# Patient Record
Sex: MALE | Race: WHITE | NOT HISPANIC OR LATINO | Employment: OTHER | URBAN - NONMETROPOLITAN AREA
[De-identification: names, ages, dates, MRNs, and addresses within clinical notes are randomized per-mention and may not be internally consistent; named-entity substitution may affect disease eponyms.]

---

## 2020-11-18 ENCOUNTER — APPOINTMENT (OUTPATIENT)
Dept: LAB | Facility: HOSPITAL | Age: 70
End: 2020-11-18
Attending: PHYSICIAN ASSISTANT
Payer: MEDICARE

## 2020-11-18 DIAGNOSIS — Z20.828 EXPOSURE TO SARS-ASSOCIATED CORONAVIRUS: ICD-10-CM

## 2020-11-18 DIAGNOSIS — R05.9 COUGH: ICD-10-CM

## 2020-11-18 DIAGNOSIS — R50.9 HYPERTHERMIA-INDUCED DEFECT: ICD-10-CM

## 2020-11-18 PROCEDURE — U0003 INFECTIOUS AGENT DETECTION BY NUCLEIC ACID (DNA OR RNA); SEVERE ACUTE RESPIRATORY SYNDROME CORONAVIRUS 2 (SARS-COV-2) (CORONAVIRUS DISEASE [COVID-19]), AMPLIFIED PROBE TECHNIQUE, MAKING USE OF HIGH THROUGHPUT TECHNOLOGIES AS DESCRIBED BY CMS-2020-01-R: HCPCS

## 2020-11-20 DIAGNOSIS — U07.1 COVID-19 VIRUS DETECTED: ICD-10-CM

## 2020-11-20 LAB — SARS-COV-2 RNA RESP QL NAA+PROBE: DETECTED

## 2020-11-22 ENCOUNTER — HOSPITAL ENCOUNTER (EMERGENCY)
Facility: HOSPITAL | Age: 70
Discharge: HOME | End: 2020-11-22
Attending: FAMILY MEDICINE
Payer: MEDICARE

## 2020-11-22 VITALS
TEMPERATURE: 99 F | HEART RATE: 79 BPM | RESPIRATION RATE: 18 BRPM | SYSTOLIC BLOOD PRESSURE: 107 MMHG | BODY MASS INDEX: 31.1 KG/M2 | WEIGHT: 210 LBS | OXYGEN SATURATION: 97 % | DIASTOLIC BLOOD PRESSURE: 56 MMHG | HEIGHT: 69 IN

## 2020-11-22 DIAGNOSIS — R05.9 COUGH: ICD-10-CM

## 2020-11-22 DIAGNOSIS — U07.1 COVID-19: Primary | ICD-10-CM

## 2020-11-22 DIAGNOSIS — R06.02 SOB (SHORTNESS OF BREATH): ICD-10-CM

## 2020-11-22 LAB
ALBUMIN SERPL BCP-MCNC: 3.2 G/DL (ref 3.5–5.2)
ALP SERPL-CCNC: 77 U/L (ref 55–135)
ALT SERPL W/O P-5'-P-CCNC: 24 U/L (ref 10–44)
ANION GAP SERPL CALC-SCNC: 6 MMOL/L (ref 8–16)
AST SERPL-CCNC: 24 U/L (ref 10–40)
BASOPHILS # BLD AUTO: 0.01 K/UL (ref 0–0.2)
BASOPHILS NFR BLD: 0.1 % (ref 0–1.9)
BILIRUB SERPL-MCNC: 0.8 MG/DL (ref 0.1–1)
BUN SERPL-MCNC: 17 MG/DL (ref 8–23)
CALCIUM SERPL-MCNC: 8.7 MG/DL (ref 8.7–10.5)
CHLORIDE SERPL-SCNC: 101 MMOL/L (ref 95–110)
CO2 SERPL-SCNC: 28 MMOL/L (ref 23–29)
CREAT SERPL-MCNC: 1.1 MG/DL (ref 0.5–1.4)
DIFFERENTIAL METHOD: ABNORMAL
EOSINOPHIL # BLD AUTO: 0 K/UL (ref 0–0.5)
EOSINOPHIL NFR BLD: 0 % (ref 0–8)
ERYTHROCYTE [DISTWIDTH] IN BLOOD BY AUTOMATED COUNT: 13.2 % (ref 11.5–14.5)
EST. GFR  (AFRICAN AMERICAN): >60 ML/MIN/1.73 M^2
EST. GFR  (NON AFRICAN AMERICAN): >60 ML/MIN/1.73 M^2
GLUCOSE SERPL-MCNC: 101 MG/DL (ref 70–110)
HCT VFR BLD AUTO: 47 % (ref 40–54)
HGB BLD-MCNC: 15.7 G/DL (ref 14–18)
IMM GRANULOCYTES # BLD AUTO: 0.03 K/UL (ref 0–0.04)
IMM GRANULOCYTES NFR BLD AUTO: 0.4 % (ref 0–0.5)
LYMPHOCYTES # BLD AUTO: 0.7 K/UL (ref 1–4.8)
LYMPHOCYTES NFR BLD: 8.9 % (ref 18–48)
MCH RBC QN AUTO: 29.4 PG (ref 27–31)
MCHC RBC AUTO-ENTMCNC: 33.4 G/DL (ref 32–36)
MCV RBC AUTO: 88 FL (ref 82–98)
MONOCYTES # BLD AUTO: 0.6 K/UL (ref 0.3–1)
MONOCYTES NFR BLD: 7.5 % (ref 4–15)
NEUTROPHILS # BLD AUTO: 6.1 K/UL (ref 1.8–7.7)
NEUTROPHILS NFR BLD: 83.1 % (ref 38–73)
NRBC BLD-RTO: 0 /100 WBC
PLATELET # BLD AUTO: 196 K/UL (ref 150–350)
PMV BLD AUTO: 11.1 FL (ref 9.2–12.9)
POTASSIUM SERPL-SCNC: 3.9 MMOL/L (ref 3.5–5.1)
PROT SERPL-MCNC: 7.3 G/DL (ref 6–8.4)
RBC # BLD AUTO: 5.34 M/UL (ref 4.6–6.2)
SODIUM SERPL-SCNC: 135 MMOL/L (ref 136–145)
TROPONIN I SERPL DL<=0.01 NG/ML-MCNC: <0.02 NG/ML (ref 0–0.03)
WBC # BLD AUTO: 7.33 K/UL (ref 3.9–12.7)

## 2020-11-22 PROCEDURE — 99285 EMERGENCY DEPT VISIT HI MDM: CPT | Mod: 25

## 2020-11-22 PROCEDURE — 36415 COLL VENOUS BLD VENIPUNCTURE: CPT

## 2020-11-22 PROCEDURE — 93005 ELECTROCARDIOGRAM TRACING: CPT

## 2020-11-22 PROCEDURE — 80053 COMPREHEN METABOLIC PANEL: CPT

## 2020-11-22 PROCEDURE — 25000003 PHARM REV CODE 250: Performed by: FAMILY MEDICINE

## 2020-11-22 PROCEDURE — 93010 EKG 12-LEAD: ICD-10-PCS | Mod: ,,, | Performed by: INTERNAL MEDICINE

## 2020-11-22 PROCEDURE — 85025 COMPLETE CBC W/AUTO DIFF WBC: CPT

## 2020-11-22 PROCEDURE — 84484 ASSAY OF TROPONIN QUANT: CPT

## 2020-11-22 PROCEDURE — 93010 ELECTROCARDIOGRAM REPORT: CPT | Mod: ,,, | Performed by: INTERNAL MEDICINE

## 2020-11-22 RX ORDER — ACETAMINOPHEN 325 MG/1
650 TABLET ORAL
Status: COMPLETED | OUTPATIENT
Start: 2020-11-22 | End: 2020-11-22

## 2020-11-22 RX ORDER — BENZONATATE 100 MG/1
100 CAPSULE ORAL 3 TIMES DAILY PRN
Qty: 12 CAPSULE | Refills: 0 | Status: SHIPPED | OUTPATIENT
Start: 2020-11-22 | End: 2020-11-26

## 2020-11-22 RX ORDER — CODEINE PHOSPHATE AND GUAIFENESIN 10; 100 MG/5ML; MG/5ML
5 SOLUTION ORAL EVERY 4 HOURS PRN
Status: DISCONTINUED | OUTPATIENT
Start: 2020-11-22 | End: 2020-11-22

## 2020-11-22 RX ORDER — BENZONATATE 100 MG/1
100 CAPSULE ORAL 3 TIMES DAILY PRN
Status: DISCONTINUED | OUTPATIENT
Start: 2020-11-22 | End: 2020-11-22 | Stop reason: HOSPADM

## 2020-11-22 RX ADMIN — BENZONATATE 100 MG: 100 CAPSULE ORAL at 04:11

## 2020-11-22 RX ADMIN — ACETAMINOPHEN 650 MG: 325 TABLET ORAL at 03:11

## 2020-11-22 NOTE — ED PROVIDER NOTES
Encounter Date: 11/22/2020       History     Chief Complaint   Patient presents with    Shortness of Breath     started friday, tested on wednesday, no results, son is positive    Cough     Patient is a 70-year-old male who had a positive COVID test to 4 days ago.  He has been doing okay at home until today when he became more short of breath.  He has had a low-grade fever up to 99.  His son also has COVID.  Patient denies chest pain.        Review of patient's allergies indicates:  No Known Allergies  History reviewed. No pertinent past medical history.  Past Surgical History:   Procedure Laterality Date    COLON SURGERY      PROSTATECTOMY       Family History   Problem Relation Age of Onset    Cancer Father     Prostate cancer Father     Hypertension Father     Cancer Mother     Breast cancer Mother     Hypertension Mother      Social History     Tobacco Use    Smoking status: Never Smoker   Substance Use Topics    Alcohol use: No     Alcohol/week: 0.0 standard drinks    Drug use: No     Review of Systems   Constitutional: Positive for fever.   HENT: Negative for sore throat.    Respiratory: Positive for shortness of breath.    Cardiovascular: Negative for chest pain.   Gastrointestinal: Negative for nausea.   Genitourinary: Negative for dysuria.   Musculoskeletal: Negative for back pain.   Skin: Negative for rash.   Neurological: Negative for weakness.   Hematological: Does not bruise/bleed easily.   All other systems reviewed and are negative.      Physical Exam     Initial Vitals [11/22/20 1431]   BP Pulse Resp Temp SpO2   138/65 88 20 98.2 °F (36.8 °C) 96 %      MAP       --         Physical Exam    Nursing note and vitals reviewed.  Constitutional: He appears well-developed and well-nourished.   HENT:   Head: Normocephalic and atraumatic.   Eyes: EOM are normal. Pupils are equal, round, and reactive to light.   Neck: Normal range of motion. Neck supple.   Cardiovascular: Normal rate, regular  rhythm, normal heart sounds and intact distal pulses.   Pulmonary/Chest: Breath sounds normal. No respiratory distress. He has no wheezes. He has no rhonchi. He has no rales.   Abdominal: Soft. Bowel sounds are normal. He exhibits no distension. There is no abdominal tenderness. There is no rebound.   Musculoskeletal: Normal range of motion. No tenderness or edema.   Neurological: He is alert and oriented to person, place, and time. No cranial nerve deficit.   Skin: Skin is warm. Capillary refill takes less than 2 seconds.   Psychiatric: He has a normal mood and affect. His behavior is normal. Judgment and thought content normal.         ED Course   Procedures  Labs Reviewed   CBC W/ AUTO DIFFERENTIAL - Abnormal; Notable for the following components:       Result Value    Lymph # 0.7 (*)     Gran % 83.1 (*)     Lymph % 8.9 (*)     All other components within normal limits   COMPREHENSIVE METABOLIC PANEL - Abnormal; Notable for the following components:    Sodium 135 (*)     Albumin 3.2 (*)     Anion Gap 6 (*)     All other components within normal limits   TROPONIN I          Imaging Results          X-Ray Chest AP Portable (Final result)  Result time 11/22/20 15:15:24    Final result by Oni Tran MD (11/22/20 15:15:24)                 Impression:      Linear opacities project over left mid and left lower lung zone, may reflect atelectasis or scarring.      Electronically signed by: Oni Tran MD  Date:    11/22/2020  Time:    15:15             Narrative:    EXAMINATION:  XR CHEST AP PORTABLE    CLINICAL HISTORY:  Suspected Covid-19 Virus Infection;    COMPARISON:  Two view chest 02/05/2011.    FINDINGS:  Frontal portable chest demonstrates linear opacities left mid and left lower lung zone.  No pleural fluid.  Cardiomediastinal silhouette is unremarkable.  No osseous abnormality.                                 Medical Decision Making:   Initial Assessment:   70-year-old male with 4 day history of  COVID.  Now with slight shortness of breath.  Differential Diagnosis:   Pneumonia, COVID, bronchitis  Clinical Tests:   Lab Tests: Ordered and Reviewed  The following lab test(s) were unremarkable: CBC and CMP  Radiological Study: Ordered and Reviewed  ED Management:  Patient stable throughout ED stay.  Pulse oximetry remains 97-98% on room air.  Patient in no distress.  He will be discharged home with instructions to home quarantine.  He will be given a prescription for Tessalon.  He was advised to return for any worsening symptoms.                             Clinical Impression:       ICD-10-CM ICD-9-CM   1. COVID-19  U07.1 079.89   2. SOB (shortness of breath)  R06.02 786.05   3. Cough  R05 786.2                      Disposition:   Disposition: Discharged  Condition: Stable     ED Disposition Condition    Discharge Stable        ED Prescriptions     Medication Sig Dispense Start Date End Date Auth. Provider    benzonatate (TESSALON) 100 MG capsule Take 1 capsule (100 mg total) by mouth 3 (three) times daily as needed for Cough. 12 capsule 11/22/2020 11/26/2020 Raúl Shaikh Jr., MD        Follow-up Information     Follow up With Specialties Details Why Contact Info    Oni Travis Jr., MD Internal Medicine In 1 week  [address] [facility] [place] [address]  [phone]                                         Raúl Shaikh Jr., MD  11/22/20 4598

## 2020-11-24 ENCOUNTER — HOSPITAL ENCOUNTER (EMERGENCY)
Facility: HOSPITAL | Age: 70
Discharge: HOME | End: 2020-11-24
Attending: EMERGENCY MEDICINE
Payer: MEDICARE

## 2020-11-24 VITALS
OXYGEN SATURATION: 94 % | SYSTOLIC BLOOD PRESSURE: 134 MMHG | DIASTOLIC BLOOD PRESSURE: 66 MMHG | TEMPERATURE: 98 F | WEIGHT: 210.63 LBS | HEART RATE: 66 BPM | BODY MASS INDEX: 31.2 KG/M2 | RESPIRATION RATE: 18 BRPM | HEIGHT: 69 IN

## 2020-11-24 DIAGNOSIS — S29.019A THORACIC MYOFASCIAL STRAIN, INITIAL ENCOUNTER: Primary | ICD-10-CM

## 2020-11-24 DIAGNOSIS — R07.9 CHEST PAIN: ICD-10-CM

## 2020-11-24 PROCEDURE — 63600175 PHARM REV CODE 636 W HCPCS: Performed by: EMERGENCY MEDICINE

## 2020-11-24 PROCEDURE — 96372 THER/PROPH/DIAG INJ SC/IM: CPT | Mod: 59

## 2020-11-24 PROCEDURE — 99284 EMERGENCY DEPT VISIT MOD MDM: CPT | Mod: 25

## 2020-11-24 RX ORDER — KETOROLAC TROMETHAMINE 30 MG/ML
30 INJECTION, SOLUTION INTRAMUSCULAR; INTRAVENOUS
Status: COMPLETED | OUTPATIENT
Start: 2020-11-24 | End: 2020-11-24

## 2020-11-24 RX ORDER — DICLOFENAC SODIUM 75 MG/1
75 TABLET, DELAYED RELEASE ORAL 2 TIMES DAILY
Qty: 20 TABLET | Refills: 0 | Status: SHIPPED | OUTPATIENT
Start: 2020-11-24 | End: 2021-08-26

## 2020-11-24 RX ORDER — DEXAMETHASONE 4 MG/1
4 TABLET ORAL EVERY 12 HOURS
COMMUNITY
End: 2021-08-26

## 2020-11-24 RX ORDER — GUAIFENESIN 1200 MG/1
TABLET, EXTENDED RELEASE ORAL
COMMUNITY
End: 2021-08-26

## 2020-11-24 RX ORDER — METHOCARBAMOL 500 MG/1
1000 TABLET, FILM COATED ORAL 3 TIMES DAILY
Qty: 30 TABLET | Refills: 0 | Status: SHIPPED | OUTPATIENT
Start: 2020-11-24 | End: 2020-11-29

## 2020-11-24 RX ADMIN — KETOROLAC TROMETHAMINE 30 MG: 30 INJECTION, SOLUTION INTRAMUSCULAR at 10:11

## 2020-11-25 NOTE — ED NOTES
NEUROLOGICAL:   Patient is awake , alert  and oriented x 4 . Pupils are PERRL. Gait is steady.   Moves all extremities without difficulty.   Patient reports no neuro complaints..  GCS 15    HEENT:   Head appears normocephalic  and symmetric .   Eyes appear WNL to both eyes. Patient reports no complaints  to both eyes .   Ears appear WNL. Patient reports no complaints  to both ears.   Nares appear patent . Patient reports no nose complaints .  Mouth appears moist, pink and teeth intact. Patient reports no mouth complaints.   Throat appears pink and moist . Patient reports no throat complaints.    CARDIOVASCULAR:   S1 and S2 present, no murmurs, gallops, or rubs, rate regular  and pulses palpable (2+)    On palpation no edema noted , noted to none.   Patient reports no CV complaints.  .   Patient vitals are WNL.    RESPIRATORY:   Airway Clear, Open, and Patent.  Respirations are even and unlabored.   Breath sounds clear  to all lung fields.   Patient reports shortness of breath on exertion and non productive cough.     GASTROINTESTINAL:   Abdomen is soft  and non-tender x 4 quadrants. Bowel sounds are normoactive to all quadrants .   Patient reports pain to lateral left upper quadrant that radiates to back. Pt reports pain is constant and has been occurring every night x couple of nights.   GENITOURINARY:   Patient reports no  complaints.     MUSCULOSKELETAL:   full range of motion to all extremities, no swelling noted , no tenderness noted and no weakness noted.   Patient reports no musculoskeletal complaints     SKIN:   Skin appears warm , dry , good turgor, color normal for race and intact. Patient reports no skin complaints.

## 2020-11-25 NOTE — ED PROVIDER NOTES
Encounter Date: 11/24/2020       History     Chief Complaint   Patient presents with    Abdominal Pain     left upper quad, rad to back.  worse at night.  8/10     70-year-old male presents with left chest and left upper abdominal pain.  Patient was diagnosed with COVID-19 about a week ago and he has been coughing.  States mild shortness of breath.  Mild low-grade fever.  Having left-sided chest and upper abdominal pain intermittently for the past few days.  No vomiting, no diarrhea        Review of patient's allergies indicates:  No Known Allergies  Past Medical History:   Diagnosis Date    Cancer      Past Surgical History:   Procedure Laterality Date    COLON SURGERY      PROSTATECTOMY       Family History   Problem Relation Age of Onset    Cancer Father     Prostate cancer Father     Hypertension Father     Cancer Mother     Breast cancer Mother     Hypertension Mother      Social History     Tobacco Use    Smoking status: Never Smoker   Substance Use Topics    Alcohol use: No     Alcohol/week: 0.0 standard drinks    Drug use: No     Review of Systems   HENT: Positive for congestion.    Respiratory: Positive for cough.    Cardiovascular: Positive for chest pain.   Gastrointestinal: Positive for abdominal pain.   All other systems reviewed and are negative.      Physical Exam     Initial Vitals   BP Pulse Resp Temp SpO2   11/24/20 2220 11/24/20 2220 11/24/20 2220 11/24/20 2224 11/24/20 2220   (!) 161/92 76 18 98.4 °F (36.9 °C) 95 %      MAP       --                Physical Exam    Nursing note and vitals reviewed.  Constitutional: He appears well-developed and well-nourished.   HENT:   Mouth/Throat: Oropharynx is clear and moist.   Cardiovascular: Normal rate and regular rhythm.   Pulmonary/Chest: No respiratory distress. He has no wheezes. He has rhonchi. He exhibits no tenderness.   Mild scattered rhonchi   Abdominal: Soft. There is no abdominal tenderness.   Neurological: He is alert and oriented  to person, place, and time.   Skin: Skin is warm and dry.         ED Course   Procedures  Labs Reviewed - No data to display       Imaging Results          X-Ray Chest 1 View (Preliminary result)  Result time 11/24/20 22:55:58    ED Interpretation by Dewey Britton MD (11/24/20 22:55:58, [facility], Emergency Medicine)    Mild pulmonary edema                               Medical Decision Making:   ED Management:  Chest x-ray with mild pulmonary edema.  Patient has nontender to palpation, pain likely secondary to repetitive coughing and some muscle strain.  Afebrile no distress oxygen saturations 95 96% on room air                             Clinical Impression:     ICD-10-CM ICD-9-CM   1. Thoracic myofascial strain, initial encounter  S29.019A 847.1   2. Chest pain  R07.9 786.50                          ED Disposition Condition    Discharge Stable        ED Prescriptions     Medication Sig Dispense Start Date End Date Auth. Provider    diclofenac (VOLTAREN) 75 MG EC tablet Take 1 tablet (75 mg total) by mouth 2 (two) times daily. 20 tablet 11/24/2020  Dewey Britton MD    methocarbamoL (ROBAXIN) 500 MG Tab Take 2 tablets (1,000 mg total) by mouth 3 (three) times daily. for 5 days 30 tablet 11/24/2020 11/29/2020 Dewey Britton MD        Follow-up Information     Follow up With Specialties Details Why Contact Info    Primary care physician   As needed, If symptoms worsen                                        Dewey Britton MD  11/24/20 7540

## 2020-12-07 ENCOUNTER — HOSPITAL ENCOUNTER (OUTPATIENT)
Dept: RADIOLOGY | Facility: HOSPITAL | Age: 70
Discharge: HOME | End: 2020-12-07
Attending: NURSE PRACTITIONER
Payer: MEDICARE

## 2020-12-07 DIAGNOSIS — M79.662 BILATERAL CALF PAIN: Primary | ICD-10-CM

## 2020-12-07 DIAGNOSIS — M79.662 BILATERAL CALF PAIN: ICD-10-CM

## 2020-12-07 DIAGNOSIS — M79.661 BILATERAL CALF PAIN: Primary | ICD-10-CM

## 2020-12-07 DIAGNOSIS — M79.661 BILATERAL CALF PAIN: ICD-10-CM

## 2020-12-07 PROCEDURE — 93971 EXTREMITY STUDY: CPT | Mod: TC,LT

## 2020-12-21 DIAGNOSIS — R10.9 RECURRING ABDOMINAL PAIN: Primary | ICD-10-CM

## 2020-12-21 DIAGNOSIS — G45.3 AMAUROSIS FUGAX: Primary | ICD-10-CM

## 2020-12-24 ENCOUNTER — HOSPITAL ENCOUNTER (OUTPATIENT)
Dept: RADIOLOGY | Facility: HOSPITAL | Age: 70
Discharge: HOME | End: 2020-12-24
Attending: INTERNAL MEDICINE
Payer: MEDICARE

## 2020-12-24 ENCOUNTER — HOSPITAL ENCOUNTER (OUTPATIENT)
Dept: RADIOLOGY | Facility: HOSPITAL | Age: 70
Discharge: HOME | End: 2020-12-24
Attending: NURSE PRACTITIONER
Payer: MEDICARE

## 2020-12-24 DIAGNOSIS — G45.3 AMAUROSIS FUGAX: ICD-10-CM

## 2020-12-24 DIAGNOSIS — R10.9 RECURRING ABDOMINAL PAIN: ICD-10-CM

## 2020-12-24 PROCEDURE — 76856 US EXAM PELVIC COMPLETE: CPT | Mod: TC

## 2020-12-24 PROCEDURE — 93880 EXTRACRANIAL BILAT STUDY: CPT | Mod: TC

## 2020-12-24 PROCEDURE — 76700 US EXAM ABDOM COMPLETE: CPT | Mod: TC

## 2020-12-28 ENCOUNTER — CLINICAL SUPPORT (OUTPATIENT)
Dept: CARDIOLOGY | Facility: HOSPITAL | Age: 70
End: 2020-12-28
Attending: INTERNAL MEDICINE
Payer: MEDICARE

## 2020-12-28 VITALS
BODY MASS INDEX: 31.1 KG/M2 | WEIGHT: 210 LBS | DIASTOLIC BLOOD PRESSURE: 66 MMHG | HEIGHT: 69 IN | SYSTOLIC BLOOD PRESSURE: 134 MMHG

## 2020-12-28 LAB
AORTIC ROOT ANNULUS: 3.44 CM
AORTIC VALVE CUSP SEPERATION: 1.51 CM
AV INDEX (PROSTH): 1.02
AV MEAN GRADIENT: 3 MMHG
AV PEAK GRADIENT: 6 MMHG
AV VALVE AREA: 3.13 CM2
AV VELOCITY RATIO: 0.93
BSA FOR ECHO PROCEDURE: 2.15 M2
CV ECHO LV RWT: 0.47 CM
DOP CALC AO PEAK VEL: 1.18 M/S
DOP CALC AO VTI: 23.56 CM
DOP CALC LVOT AREA: 3.1 CM2
DOP CALC LVOT DIAMETER: 1.98 CM
DOP CALC LVOT PEAK VEL: 1.1 M/S
DOP CALC LVOT STROKE VOLUME: 73.74 CM3
DOP CALCLVOT PEAK VEL VTI: 23.96 CM
E WAVE DECELERATION TIME: 191.08 MSEC
E/A RATIO: 0.93
ECHO LV POSTERIOR WALL: 1.21 CM (ref 0.6–1.1)
FRACTIONAL SHORTENING: 32 % (ref 28–44)
INTERVENTRICULAR SEPTUM: 1.24 CM (ref 0.6–1.1)
LEFT ATRIUM SIZE: 3.86 CM
LEFT INTERNAL DIMENSION IN SYSTOLE: 3.53 CM (ref 2.1–4)
LEFT VENTRICLE DIASTOLIC VOLUME INDEX: 61.47 ML/M2
LEFT VENTRICLE DIASTOLIC VOLUME: 129.63 ML
LEFT VENTRICLE MASS INDEX: 121 G/M2
LEFT VENTRICLE SYSTOLIC VOLUME INDEX: 24.5 ML/M2
LEFT VENTRICLE SYSTOLIC VOLUME: 51.76 ML
LEFT VENTRICULAR INTERNAL DIMENSION IN DIASTOLE: 5.2 CM (ref 3.5–6)
LEFT VENTRICULAR MASS: 256.1 G
MV PEAK A VEL: 0.86 M/S
MV PEAK E VEL: 0.8 M/S
PISA MRMAX VEL: 0.05 M/S
PISA TR MAX VEL: 2.92 M/S
PV PEAK VELOCITY: 0.56 CM/S
RA PRESSURE: 8 MMHG
RA WIDTH: 3.4 CM
RIGHT VENTRICULAR END-DIASTOLIC DIMENSION: 2.94 CM
TR MAX PG: 34 MMHG
TV REST PULMONARY ARTERY PRESSURE: 42 MMHG

## 2020-12-28 PROCEDURE — 93306 TTE W/DOPPLER COMPLETE: CPT

## 2021-02-09 ENCOUNTER — IMMUNIZATION (OUTPATIENT)
Dept: OBSTETRICS AND GYNECOLOGY | Facility: CLINIC | Age: 71
End: 2021-02-09
Payer: MEDICARE

## 2021-02-09 DIAGNOSIS — Z23 NEED FOR VACCINATION: Primary | ICD-10-CM

## 2021-02-09 PROCEDURE — 91300 COVID-19, MRNA, LNP-S, PF, 30 MCG/0.3 ML DOSE VACCINE: CPT | Mod: PBBFAC

## 2021-03-02 ENCOUNTER — IMMUNIZATION (OUTPATIENT)
Dept: OBSTETRICS AND GYNECOLOGY | Facility: CLINIC | Age: 71
End: 2021-03-02
Payer: MEDICARE

## 2021-03-02 DIAGNOSIS — Z23 NEED FOR VACCINATION: Primary | ICD-10-CM

## 2021-03-02 PROCEDURE — 91300 COVID-19, MRNA, LNP-S, PF, 30 MCG/0.3 ML DOSE VACCINE: ICD-10-PCS | Mod: ,,, | Performed by: ANESTHESIOLOGY

## 2021-03-02 PROCEDURE — 91300 COVID-19, MRNA, LNP-S, PF, 30 MCG/0.3 ML DOSE VACCINE: CPT | Mod: ,,, | Performed by: ANESTHESIOLOGY

## 2021-03-02 PROCEDURE — 0002A COVID-19, MRNA, LNP-S, PF, 30 MCG/0.3 ML DOSE VACCINE: CPT | Mod: CV19,,, | Performed by: ANESTHESIOLOGY

## 2021-03-02 PROCEDURE — 0002A COVID-19, MRNA, LNP-S, PF, 30 MCG/0.3 ML DOSE VACCINE: ICD-10-PCS | Mod: CV19,,, | Performed by: ANESTHESIOLOGY

## 2021-08-25 PROBLEM — M79.662 PAIN OF LEFT CALF: Status: ACTIVE | Noted: 2021-08-25

## 2021-08-25 PROBLEM — I77.9 BILATERAL CAROTID ARTERY DISEASE: Status: ACTIVE | Noted: 2021-08-25

## 2021-08-25 PROBLEM — R10.10 PAIN OF UPPER ABDOMEN: Status: ACTIVE | Noted: 2021-08-25

## 2021-08-25 PROBLEM — G45.3 AMAUROSIS FUGAX OF LEFT EYE: Status: ACTIVE | Noted: 2021-08-25

## 2021-08-26 PROBLEM — H61.21 CERUMEN DEBRIS ON TYMPANIC MEMBRANE OF RIGHT EAR: Status: ACTIVE | Noted: 2021-08-26

## 2021-08-26 PROBLEM — D17.0 LIPOMA OF NECK: Status: ACTIVE | Noted: 2021-08-26

## 2021-08-26 PROBLEM — R05.9 COUGH: Status: ACTIVE | Noted: 2021-08-26

## 2021-08-26 PROBLEM — R07.89 CHEST WALL DISCOMFORT: Status: ACTIVE | Noted: 2021-08-26

## 2021-08-26 PROBLEM — E78.5 HYPERLIPEMIA: Status: ACTIVE | Noted: 2021-08-26

## 2021-09-10 PROBLEM — E66.9 OBESITY: Status: ACTIVE | Noted: 2021-09-10

## 2022-02-07 PROBLEM — M79.2 NEURALGIA OF CHEST: Status: ACTIVE | Noted: 2022-02-07

## 2022-02-14 PROBLEM — R12 HEARTBURN: Status: ACTIVE | Noted: 2022-02-14

## 2022-02-16 ENCOUNTER — HOSPITAL ENCOUNTER (OUTPATIENT)
Dept: RADIOLOGY | Facility: HOSPITAL | Age: 72
Discharge: HOME | End: 2022-02-16
Attending: NURSE PRACTITIONER
Payer: MEDICARE

## 2022-02-16 DIAGNOSIS — R91.1 SOLITARY PULMONARY NODULE: ICD-10-CM

## 2022-02-16 DIAGNOSIS — R05.9 COUGH: ICD-10-CM

## 2022-02-16 PROCEDURE — 71250 CT THORAX DX C-: CPT | Mod: TC

## 2022-03-03 PROBLEM — H61.21 CERUMEN DEBRIS ON TYMPANIC MEMBRANE OF RIGHT EAR: Status: RESOLVED | Noted: 2021-08-26 | Resolved: 2022-03-03

## 2022-03-03 PROBLEM — Z00.00 WELLNESS EXAMINATION: Status: ACTIVE | Noted: 2022-03-03

## 2022-06-06 PROBLEM — Z00.00 WELLNESS EXAMINATION: Status: RESOLVED | Noted: 2022-03-03 | Resolved: 2022-06-06

## 2022-09-22 PROBLEM — R42 DIZZINESS: Status: ACTIVE | Noted: 2022-09-22

## 2022-09-22 PROBLEM — H66.90 OTITIS MEDIA: Status: ACTIVE | Noted: 2022-09-22

## 2022-10-18 PROBLEM — R73.01 IFG (IMPAIRED FASTING GLUCOSE): Status: ACTIVE | Noted: 2022-10-18

## 2022-10-18 PROBLEM — L91.8 SKIN TAG: Status: ACTIVE | Noted: 2022-10-18

## 2022-10-18 PROBLEM — R03.0 ELEVATED BP WITHOUT DIAGNOSIS OF HYPERTENSION: Status: ACTIVE | Noted: 2022-10-18

## 2022-10-18 PROBLEM — E63.0 ESSENTIAL FATTY ACID (EFA) DEFICIENCY: Status: ACTIVE | Noted: 2022-10-18

## 2023-01-17 PROBLEM — B34.9 VIRAL SYNDROME: Status: ACTIVE | Noted: 2023-01-17

## 2023-03-06 PROBLEM — H61.23 BILATERAL IMPACTED CERUMEN: Status: ACTIVE | Noted: 2023-03-06

## 2023-03-07 PROBLEM — B34.9 VIRAL SYNDROME: Status: RESOLVED | Noted: 2023-01-17 | Resolved: 2023-03-07

## 2023-05-28 ENCOUNTER — HOSPITAL ENCOUNTER (EMERGENCY)
Facility: HOSPITAL | Age: 73
Discharge: HOME | End: 2023-05-28
Attending: EMERGENCY MEDICINE
Payer: MEDICARE

## 2023-05-28 VITALS
OXYGEN SATURATION: 99 % | TEMPERATURE: 98 F | SYSTOLIC BLOOD PRESSURE: 141 MMHG | HEIGHT: 69 IN | HEART RATE: 55 BPM | WEIGHT: 216.63 LBS | RESPIRATION RATE: 18 BRPM | BODY MASS INDEX: 32.08 KG/M2 | DIASTOLIC BLOOD PRESSURE: 70 MMHG

## 2023-05-28 DIAGNOSIS — N20.0 KIDNEY STONE: Primary | ICD-10-CM

## 2023-05-28 DIAGNOSIS — R10.13 EPIGASTRIC ABDOMINAL PAIN: ICD-10-CM

## 2023-05-28 LAB
ALBUMIN SERPL BCP-MCNC: 3.3 G/DL (ref 3.5–5.2)
ALP SERPL-CCNC: 71 U/L (ref 55–135)
ALT SERPL W/O P-5'-P-CCNC: 37 U/L (ref 10–44)
ANION GAP SERPL CALC-SCNC: 4 MMOL/L (ref 8–16)
AST SERPL-CCNC: 28 U/L (ref 10–40)
BACTERIA #/AREA URNS HPF: NEGATIVE /HPF
BASOPHILS # BLD AUTO: 0.07 K/UL (ref 0–0.2)
BASOPHILS NFR BLD: 1.2 % (ref 0–1.9)
BILIRUB SERPL-MCNC: 0.8 MG/DL (ref 0.1–1)
BILIRUB UR QL STRIP: NEGATIVE
BUN SERPL-MCNC: 15 MG/DL (ref 8–23)
CALCIUM SERPL-MCNC: 8.6 MG/DL (ref 8.7–10.5)
CHLORIDE SERPL-SCNC: 111 MMOL/L (ref 95–110)
CLARITY UR: CLEAR
CO2 SERPL-SCNC: 24 MMOL/L (ref 23–29)
COLOR UR: YELLOW
CREAT SERPL-MCNC: 1.2 MG/DL (ref 0.5–1.4)
DIFFERENTIAL METHOD: NORMAL
EOSINOPHIL # BLD AUTO: 0.2 K/UL (ref 0–0.5)
EOSINOPHIL NFR BLD: 2.7 % (ref 0–8)
ERYTHROCYTE [DISTWIDTH] IN BLOOD BY AUTOMATED COUNT: 12.8 % (ref 11.5–14.5)
EST. GFR  (NO RACE VARIABLE): >60 ML/MIN/1.73 M^2
GLUCOSE SERPL-MCNC: 124 MG/DL (ref 70–110)
GLUCOSE UR QL STRIP: NEGATIVE
HCT VFR BLD AUTO: 51.8 % (ref 40–54)
HGB BLD-MCNC: 17.5 G/DL (ref 14–18)
HGB UR QL STRIP: ABNORMAL
HYALINE CASTS #/AREA URNS LPF: 0 /LPF
IMM GRANULOCYTES # BLD AUTO: 0.03 K/UL (ref 0–0.04)
IMM GRANULOCYTES NFR BLD AUTO: 0.5 % (ref 0–0.5)
KETONES UR QL STRIP: NEGATIVE
LEUKOCYTE ESTERASE UR QL STRIP: NEGATIVE
LIPASE SERPL-CCNC: 124 U/L (ref 23–300)
LYMPHOCYTES # BLD AUTO: 1.4 K/UL (ref 1–4.8)
LYMPHOCYTES NFR BLD: 23.9 % (ref 18–48)
MCH RBC QN AUTO: 30.3 PG (ref 27–31)
MCHC RBC AUTO-ENTMCNC: 33.8 G/DL (ref 32–36)
MCV RBC AUTO: 90 FL (ref 82–98)
MICROSCOPIC COMMENT: ABNORMAL
MONOCYTES # BLD AUTO: 0.5 K/UL (ref 0.3–1)
MONOCYTES NFR BLD: 9 % (ref 4–15)
NEUTROPHILS # BLD AUTO: 3.7 K/UL (ref 1.8–7.7)
NEUTROPHILS NFR BLD: 62.7 % (ref 38–73)
NITRITE UR QL STRIP: NEGATIVE
NRBC BLD-RTO: 0 /100 WBC
NT-PROBNP SERPL-MCNC: 18 PG/ML (ref 5–900)
PH UR STRIP: 8 [PH] (ref 5–8)
PLATELET # BLD AUTO: 176 K/UL (ref 150–450)
PMV BLD AUTO: 11.5 FL (ref 9.2–12.9)
POTASSIUM SERPL-SCNC: 4.4 MMOL/L (ref 3.5–5.1)
PROT SERPL-MCNC: 7.3 G/DL (ref 6–8.4)
PROT UR QL STRIP: NEGATIVE
RBC # BLD AUTO: 5.77 M/UL (ref 4.6–6.2)
RBC #/AREA URNS HPF: 22 /HPF (ref 0–4)
SODIUM SERPL-SCNC: 139 MMOL/L (ref 136–145)
SP GR UR STRIP: >1.045 (ref 1–1.03)
SQUAMOUS #/AREA URNS HPF: 0 /HPF
TROPONIN I SERPL DL<=0.01 NG/ML-MCNC: 6.8 PG/ML (ref 0–60)
URN SPEC COLLECT METH UR: ABNORMAL
UROBILINOGEN UR STRIP-ACNC: NEGATIVE EU/DL
WBC # BLD AUTO: 5.87 K/UL (ref 3.9–12.7)
WBC #/AREA URNS HPF: 0 /HPF (ref 0–5)

## 2023-05-28 PROCEDURE — 25000003 PHARM REV CODE 250: Performed by: CLINICAL NURSE SPECIALIST

## 2023-05-28 PROCEDURE — 83880 ASSAY OF NATRIURETIC PEPTIDE: CPT | Performed by: CLINICAL NURSE SPECIALIST

## 2023-05-28 PROCEDURE — 99285 EMERGENCY DEPT VISIT HI MDM: CPT | Mod: 25

## 2023-05-28 PROCEDURE — 25500020 PHARM REV CODE 255: Performed by: EMERGENCY MEDICINE

## 2023-05-28 PROCEDURE — 81000 URINALYSIS NONAUTO W/SCOPE: CPT | Performed by: CLINICAL NURSE SPECIALIST

## 2023-05-28 PROCEDURE — 83690 ASSAY OF LIPASE: CPT | Performed by: CLINICAL NURSE SPECIALIST

## 2023-05-28 PROCEDURE — 84484 ASSAY OF TROPONIN QUANT: CPT | Performed by: CLINICAL NURSE SPECIALIST

## 2023-05-28 PROCEDURE — 36415 COLL VENOUS BLD VENIPUNCTURE: CPT | Performed by: CLINICAL NURSE SPECIALIST

## 2023-05-28 PROCEDURE — 80053 COMPREHEN METABOLIC PANEL: CPT | Performed by: CLINICAL NURSE SPECIALIST

## 2023-05-28 PROCEDURE — 85025 COMPLETE CBC W/AUTO DIFF WBC: CPT | Performed by: CLINICAL NURSE SPECIALIST

## 2023-05-28 RX ORDER — LIDOCAINE HYDROCHLORIDE 20 MG/ML
15 SOLUTION OROPHARYNGEAL ONCE
Status: COMPLETED | OUTPATIENT
Start: 2023-05-28 | End: 2023-05-28

## 2023-05-28 RX ORDER — ONDANSETRON 4 MG/1
4 TABLET, FILM COATED ORAL EVERY 6 HOURS
Qty: 12 TABLET | Refills: 0 | Status: SHIPPED | OUTPATIENT
Start: 2023-05-28 | End: 2023-07-31

## 2023-05-28 RX ORDER — HYDROCODONE BITARTRATE AND ACETAMINOPHEN 10; 325 MG/1; MG/1
1 TABLET ORAL EVERY 6 HOURS PRN
Qty: 12 TABLET | Refills: 0 | Status: SHIPPED | OUTPATIENT
Start: 2023-05-28 | End: 2023-07-26

## 2023-05-28 RX ORDER — TAMSULOSIN HYDROCHLORIDE 0.4 MG/1
0.4 CAPSULE ORAL DAILY
Qty: 10 CAPSULE | Refills: 0 | Status: SHIPPED | OUTPATIENT
Start: 2023-05-28 | End: 2023-07-31

## 2023-05-28 RX ORDER — MAG HYDROX/ALUMINUM HYD/SIMETH 200-200-20
30 SUSPENSION, ORAL (FINAL DOSE FORM) ORAL ONCE
Status: COMPLETED | OUTPATIENT
Start: 2023-05-28 | End: 2023-05-28

## 2023-05-28 RX ADMIN — ALUMINUM HYDROXIDE, MAGNESIUM HYDROXIDE, AND SIMETHICONE 30 ML: 200; 200; 20 SUSPENSION ORAL at 01:05

## 2023-05-28 RX ADMIN — LIDOCAINE HYDROCHLORIDE 15 ML: 20 SOLUTION ORAL at 01:05

## 2023-05-28 RX ADMIN — IOHEXOL 100 ML: 350 INJECTION, SOLUTION INTRAVENOUS at 02:05

## 2023-05-28 NOTE — DISCHARGE INSTRUCTIONS
Follow-up with urology.  Follow-up with PCP for continued epigastric pain    CT results 1. 10 mm stone in the right renal pelvis with some prominence of the right renal pelvis.  3 mm stone right kidney.  2. 4 mm stone distal 3rd of left ureter with mild ureteral dilatation

## 2023-05-28 NOTE — ED PROVIDER NOTES
Encounter Date: 5/28/2023       History     Chief Complaint   Patient presents with    Abdominal Pain     Upper ABD pain, had constipation a week or so ago. Now having diarrhea. Pain is worse when lying flat. Belching after eating.      72-year-old male presents to the emergency room with epigastric pain, constipation, diarrhea for the last week.  Patient reports pain worse with lying down.  Belching after meals.  History of prostate cancer, diverticulitis      Review of patient's allergies indicates:  No Known Allergies  Past Medical History:   Diagnosis Date    Cancer     prostate cancer    Diverticulitis     Hyperlipidemia     Neuropathy     Obesity     BHASKAR (obstructive sleep apnea)     PAD (peripheral artery disease)     Vitamin D deficiency      Past Surgical History:   Procedure Laterality Date    COLON SURGERY      resection    PROSTATECTOMY       Family History   Problem Relation Age of Onset    Cancer Father     Prostate cancer Father     Hypertension Father     Cancer Mother     Breast cancer Mother     Hypertension Mother     Thyroid cancer Son      Social History     Tobacco Use    Smoking status: Never    Smokeless tobacco: Never   Substance Use Topics    Alcohol use: No     Alcohol/week: 0.0 standard drinks    Drug use: No     Review of Systems   Constitutional:  Negative for fever.   HENT:  Negative for sore throat.    Respiratory:  Negative for shortness of breath.    Cardiovascular:  Negative for chest pain.   Gastrointestinal:  Positive for abdominal pain, constipation and diarrhea. Negative for nausea.   Genitourinary:  Negative for dysuria.   Musculoskeletal:  Negative for back pain.   Skin:  Negative for rash.   Neurological:  Negative for weakness.   Hematological:  Does not bruise/bleed easily.   All other systems reviewed and are negative.    Physical Exam     Initial Vitals [05/28/23 1245]   BP Pulse Resp Temp SpO2   (!) 165/95 70 16 97.5 °F (36.4 °C) 96 %      MAP       --         Physical  Exam    Nursing note and vitals reviewed.  Constitutional: He appears well-developed and well-nourished.   HENT:   Head: Normocephalic and atraumatic.   Eyes: Pupils are equal, round, and reactive to light.   Cardiovascular:  Normal rate and regular rhythm.           Pulmonary/Chest: Breath sounds normal.   Abdominal: Abdomen is soft. Bowel sounds are normal. There is abdominal tenderness.   Musculoskeletal:         General: Normal range of motion.     Neurological: He is alert and oriented to person, place, and time.   Psychiatric: He has a normal mood and affect.       ED Course   Procedures  Labs Reviewed   COMPREHENSIVE METABOLIC PANEL - Abnormal; Notable for the following components:       Result Value    Chloride 111 (*)     Glucose 124 (*)     Calcium 8.6 (*)     Albumin 3.3 (*)     Anion Gap 4 (*)     All other components within normal limits   URINALYSIS, REFLEX TO URINE CULTURE - Abnormal; Notable for the following components:    Occult Blood UA 2+ (*)     All other components within normal limits    Narrative:     Preferred Collection Type->Urine, Clean Catch  Specimen Source->Urine   URINALYSIS MICROSCOPIC - Abnormal; Notable for the following components:    RBC, UA 22 (*)     Hyaline Casts, UA 0.0 (*)     All other components within normal limits    Narrative:     Preferred Collection Type->Urine, Clean Catch  Specimen Source->Urine   NT-PRO NATRIURETIC PEPTIDE   CBC W/ AUTO DIFFERENTIAL   LIPASE   TROPONIN I HIGH SENSITIVITY          Imaging Results              CT Abdomen Pelvis With Contrast (Final result)  Result time 05/28/23 14:12:11      Final result by Oni Tran MD (05/28/23 14:12:11)                   Impression:      1. 10 mm stone in the right renal pelvis with some prominence of the right renal pelvis.  3 mm stone right kidney.  2. 4 mm stone distal 3rd of left ureter with mild ureteral dilatation.      Electronically signed by: Oni Tran  MD  Date:    05/28/2023  Time:    14:12               Narrative:    EXAMINATION:  CT ABDOMEN PELVIS WITH CONTRAST    CLINICAL HISTORY:  Epigastric pain;    TECHNIQUE:  Axial CT images were obtained. Iterative reconstruction technique was used. CT/cardiac nuclear exam/s in prior 12 months: 0.    COMPARISON:  Abdominal ultrasound 12/24/2020.    FINDINGS:  Several benign hepatic cysts.  A benign hemangioma lateral aspect of right hepatic lobe measuring 3.4 cm.  No gallstones.  The spleen, pancreas, adrenal glands demonstrate no abnormality.  There is a 10 mm stone in the right renal pelvis with some prominence of the renal pelvis.  3 mm stone in the right kidney.  There is no left renal stone.  In the distal 3rd of the left ureter, a 4 mm stone with mild ureteral dilatation.  No hydronephrosis.    No gross gastric abnormality.  No dilated bowel.  The appendix is normal.  Partial sigmoid colon resection.  No abnormality of bladder.  No aortic aneurysm.  No lymph node enlargement.  Visualized lung bases demonstrate right middle lobe scarring.  A 5 mm noncalcified pulmonary nodule in right lower lobe (image 7 of series 2 axial), stable prior chest CT 02/16/2022.  No osseous abnormality.                                       Medications   aluminum-magnesium hydroxide-simethicone 200-200-20 mg/5 mL suspension 30 mL (30 mLs Oral Given 5/28/23 1304)     And   LIDOcaine HCl 2% oral solution 15 mL (15 mLs Oral Given 5/28/23 1304)   iohexoL (OMNIPAQUE 350) injection 100 mL (100 mLs Intravenous Given 5/28/23 1400)     Medical Decision Making:   Differential Diagnosis:   Diverticulitis, GERD, cholelithiasis  Clinical Tests:   Lab Tests: Ordered and Reviewed  Radiological Study: Ordered and Reviewed                        Clinical Impression:   Final diagnoses:  [N20.0] Kidney stone (Primary)  [R10.13] Epigastric abdominal pain        ED Disposition Condition    Discharge Stable          ED Prescriptions       Medication Sig  Dispense Start Date End Date Auth. Provider    tamsulosin (FLOMAX) 0.4 mg Cap Take 1 capsule (0.4 mg total) by mouth once daily. 10 capsule 5/28/2023 5/27/2024 Alyssia Abreu NP    HYDROcodone-acetaminophen (NORCO)  mg per tablet Take 1 tablet by mouth every 6 (six) hours as needed for Pain. 12 tablet 5/28/2023 -- Alyssia Abreu NP    ondansetron (ZOFRAN) 4 MG tablet Take 1 tablet (4 mg total) by mouth every 6 (six) hours. 12 tablet 5/28/2023 -- Alyssia Abreu NP          Follow-up Information       Follow up With Specialties Details Why Contact Info    Oni Travis Jr., MD Internal Medicine  As needed [address] [place] [id number]  [phone]               Alyssia Abreu NP  05/28/23 4489

## 2023-05-28 NOTE — ED NOTES
Stool sample kit provided to patient to take home. Lab aware that patient will be returning stool sample tomorrow.

## 2023-05-28 NOTE — Clinical Note
"Jed "Timothy Dillard was seen and treated in our emergency department on 5/28/2023.     COVID-19 is present in our communities across the state. There is limited testing for COVID at this time, so not all patients can be tested. In this situation, your employee meets the following criteria:    Jed Dillard III has expressed a desire/need to be tested for the COVID-19 virus but has none of the symptoms that one would associate with COVID-19. In the absence of symptoms, the patient does NOT meet the criteria for testing and should proceed with their work schedule as planned, following the routine infection control policies of the employer, as well as good hand hygiene.      If you have any questions or concerns, or if I can be of further assistance, please do not hesitate to contact me.    Sincerely,             Marlon Parra MD"

## 2023-07-03 PROBLEM — H66.90 OTITIS MEDIA: Status: RESOLVED | Noted: 2022-09-22 | Resolved: 2023-07-03

## 2023-07-03 PROBLEM — Z00.00 ROUTINE GENERAL MEDICAL EXAMINATION AT A HEALTH CARE FACILITY: Status: ACTIVE | Noted: 2023-07-03

## 2023-07-03 PROBLEM — N20.0 RENAL STONES: Status: ACTIVE | Noted: 2023-07-03

## 2023-07-17 PROBLEM — Z46.6 FITTING AND ADJUSTMENT OF URINARY DEVICE: Status: ACTIVE | Noted: 2023-07-17

## 2023-07-17 PROBLEM — N20.2 CALCULUS OF KIDNEY AND URETER: Status: ACTIVE | Noted: 2023-07-17

## 2023-07-26 PROBLEM — R09.89 CHEST CONGESTION: Status: RESOLVED | Noted: 2023-07-26 | Resolved: 2023-07-26

## 2023-07-26 PROBLEM — J06.9 VIRAL UPPER RESPIRATORY TRACT INFECTION: Status: ACTIVE | Noted: 2023-07-26

## 2023-07-26 PROBLEM — R09.89 CHEST CONGESTION: Status: ACTIVE | Noted: 2023-07-26

## 2023-08-03 ENCOUNTER — HOSPITAL ENCOUNTER (OUTPATIENT)
Dept: RADIOLOGY | Facility: HOSPITAL | Age: 73
Discharge: HOME | End: 2023-08-03
Attending: FAMILY MEDICINE
Payer: MEDICARE

## 2023-08-03 DIAGNOSIS — J06.9 VIRAL UPPER RESPIRATORY TRACT INFECTION: ICD-10-CM

## 2023-08-03 PROCEDURE — 71046 X-RAY EXAM CHEST 2 VIEWS: CPT | Mod: TC

## 2023-09-09 ENCOUNTER — HOSPITAL ENCOUNTER (EMERGENCY)
Facility: HOSPITAL | Age: 73
Discharge: HOME | End: 2023-09-09
Attending: EMERGENCY MEDICINE
Payer: MEDICARE

## 2023-09-09 VITALS
HEIGHT: 67 IN | WEIGHT: 224 LBS | DIASTOLIC BLOOD PRESSURE: 70 MMHG | HEART RATE: 76 BPM | BODY MASS INDEX: 35.16 KG/M2 | RESPIRATION RATE: 18 BRPM | OXYGEN SATURATION: 96 % | SYSTOLIC BLOOD PRESSURE: 145 MMHG | TEMPERATURE: 98 F

## 2023-09-09 DIAGNOSIS — J06.9 UPPER RESPIRATORY TRACT INFECTION, UNSPECIFIED TYPE: Primary | ICD-10-CM

## 2023-09-09 LAB
CTP QC/QA: YES
CTP QC/QA: YES
GROUP A STREP, MOLECULAR: NEGATIVE
POC MOLECULAR INFLUENZA A AGN: NEGATIVE
POC MOLECULAR INFLUENZA B AGN: NEGATIVE
SARS-COV-2 RDRP RESP QL NAA+PROBE: NEGATIVE

## 2023-09-09 PROCEDURE — 63600175 PHARM REV CODE 636 W HCPCS: Performed by: EMERGENCY MEDICINE

## 2023-09-09 PROCEDURE — 87502 INFLUENZA DNA AMP PROBE: CPT

## 2023-09-09 PROCEDURE — 99284 EMERGENCY DEPT VISIT MOD MDM: CPT

## 2023-09-09 PROCEDURE — 87635 SARS-COV-2 COVID-19 AMP PRB: CPT | Performed by: EMERGENCY MEDICINE

## 2023-09-09 PROCEDURE — 96372 THER/PROPH/DIAG INJ SC/IM: CPT | Performed by: EMERGENCY MEDICINE

## 2023-09-09 PROCEDURE — 87651 STREP A DNA AMP PROBE: CPT | Performed by: EMERGENCY MEDICINE

## 2023-09-09 RX ORDER — PREDNISONE 20 MG/1
40 TABLET ORAL DAILY
Qty: 10 TABLET | Refills: 0 | Status: SHIPPED | OUTPATIENT
Start: 2023-09-09 | End: 2023-09-14

## 2023-09-09 RX ORDER — METHYLPREDNISOLONE ACETATE 80 MG/ML
120 INJECTION, SUSPENSION INTRA-ARTICULAR; INTRALESIONAL; INTRAMUSCULAR; SOFT TISSUE
Status: COMPLETED | OUTPATIENT
Start: 2023-09-09 | End: 2023-09-09

## 2023-09-09 RX ADMIN — METHYLPREDNISOLONE ACETATE 120 MG: 80 INJECTION, SUSPENSION INTRA-ARTICULAR; INTRALESIONAL; INTRAMUSCULAR; SOFT TISSUE at 04:09

## 2023-09-09 NOTE — ED PROVIDER NOTES
Encounter Date: 9/9/2023       History     Chief Complaint   Patient presents with    Chest Congestion    Cough     Pt stated that he has been experiencing chest congestion / cough / sore throat for the past few days.      73-year-old male complaining of cough congestion sore throat for the past 3 days.  Denies any fever, no shortness of breath.  Also complaining of headache.  No nausea vomiting diarrhea.  Cough is nonproductive.  Tried salt water gargle which helped a sore throat.  Alert oriented x4, GCS is 15, not ill appearing      Review of patient's allergies indicates:  No Known Allergies  Past Medical History:   Diagnosis Date    Acid reflux     Cancer     prostate cancer    Disc disorder     Diverticulitis     Diverticulitis 08/2020    Hyperlipidemia     Kidney stone     Neuropathy     Obesity     BHASKAR (obstructive sleep apnea)     machine used    PAD (peripheral artery disease)     Sinusitis     Vitamin D deficiency      Past Surgical History:   Procedure Laterality Date    COLON SURGERY      resection    CYSTOSCOPY W/ RETROGRADES Bilateral 7/5/2023    Procedure: CYSTOSCOPY, WITH RETROGRADE PYELOGRAM;  Surgeon: Stew Bernard MD;  Location: [facility] OR;  Service: Urology;  Laterality: Bilateral;  7am per Vera    CYSTOSCOPY W/ RETROGRADES Bilateral 7/20/2023    Procedure: CYSTOSCOPY, WITH RETROGRADE PYELOGRAM;  Surgeon: Stew Bernard MD;  Location: [facility] OR;  Service: Urology;  Laterality: Bilateral;    EXTRACORPOREAL SHOCK WAVE LITHOTRIPSY Right 7/20/2023    Procedure: LITHOTRIPSY, ESWL;  Surgeon: Stew Bernard MD;  Location: [facility] OR;  Service: Urology;  Laterality: Right;    EXTRACTION - STONE Bilateral 7/5/2023    Procedure: EXTRACTION - STONE;  Surgeon: Stew Bernard MD;  Location: [facility] OR;  Service: Urology;  Laterality: Bilateral;    LASER LITHOTRIPSY Left 7/5/2023    Procedure: LITHOTRIPSY, USING LASER;  Surgeon: Stew Bernard MD;  Location: [facility] OR;  Service: Urology;   Laterality: Left;    LASER LITHOTRIPSY Right 7/20/2023    Procedure: LITHOTRIPSY, USING LASER;  Surgeon: Stew Bernard MD;  Location: [facility] OR;  Service: Urology;  Laterality: Right;    PROSTATECTOMY      REMOVAL, STENT, URETER Bilateral 7/20/2023    Procedure: REMOVAL, STENT, URETER;  Surgeon: Stew Bernard MD;  Location: [facility] OR;  Service: Urology;  Laterality: Bilateral;    URETERAL STENT PLACEMENT Bilateral 7/5/2023    Procedure: INSERTION, STENT, URETER;  Surgeon: Stew Bernard MD;  Location: [facility] OR;  Service: Urology;  Laterality: Bilateral;    URETEROSCOPY Left 7/5/2023    Procedure: URETEROSCOPY;  Surgeon: Stew Bernard MD;  Location: [facility] OR;  Service: Urology;  Laterality: Left;    URETEROSCOPY Right 7/20/2023    Procedure: URETEROSCOPY;  Surgeon: Stew Bernard MD;  Location: [facility] OR;  Service: Urology;  Laterality: Right;     Family History   Problem Relation Age of Onset    Cancer Father     Prostate cancer Father     Hypertension Father     Cancer Mother     Breast cancer Mother     Hypertension Mother     Thyroid cancer Son      Social History     Tobacco Use    Smoking status: Former     Types: Cigarettes    Smokeless tobacco: Never    Tobacco comments:     Quit 40 yrs ago   Substance Use Topics    Alcohol use: No     Alcohol/week: 0.0 standard drinks of alcohol    Drug use: No     Review of Systems   Constitutional:  Negative for fever.   HENT:  Positive for congestion. Negative for sore throat.    Respiratory:  Positive for cough. Negative for shortness of breath.    Cardiovascular:  Negative for chest pain.   Gastrointestinal:  Negative for nausea.   Genitourinary:  Negative for dysuria.   Musculoskeletal:  Negative for back pain.   Skin:  Negative for rash.   Neurological:  Positive for headaches. Negative for weakness.   Hematological:  Does not bruise/bleed easily.   All other systems reviewed and are negative.      Physical Exam     Initial Vitals [09/09/23 1556]    BP Pulse Resp Temp SpO2   (!) 145/70 76 18 98 °F (36.7 °C) 96 %      MAP       --         Physical Exam    Nursing note and vitals reviewed.  Constitutional: He appears well-developed and well-nourished. He is not diaphoretic. No distress.   HENT:   Head: Normocephalic and atraumatic.   Mouth/Throat: Oropharynx is clear and moist.   Eyes: Conjunctivae and EOM are normal. Pupils are equal, round, and reactive to light. Right eye exhibits no discharge. Left eye exhibits no discharge. No scleral icterus.   Neck: Neck supple. No JVD present.   Normal range of motion.  Cardiovascular:  Normal rate, regular rhythm, normal heart sounds and intact distal pulses.           No murmur heard.  Pulmonary/Chest: Breath sounds normal. No stridor. No respiratory distress. He has no wheezes. He has no rhonchi. He has no rales. He exhibits no tenderness.   Abdominal: Abdomen is soft. Bowel sounds are normal. He exhibits no distension and no mass. There is no abdominal tenderness. There is no rebound and no guarding.   Musculoskeletal:         General: No tenderness or edema. Normal range of motion.      Cervical back: Normal range of motion and neck supple.     Neurological: He is alert and oriented to person, place, and time. He has normal strength. GCS score is 15. GCS eye subscore is 4. GCS verbal subscore is 5. GCS motor subscore is 6.   Skin: Skin is warm and dry. Capillary refill takes less than 2 seconds.         ED Course   Procedures  Labs Reviewed   GROUP A STREP, MOLECULAR   SARS-COV-2 RDRP GENE   POCT INFLUENZA A/B MOLECULAR          Imaging Results    None          Medications   methylPREDNISolone acetate injection 120 mg (120 mg Intramuscular Given 9/9/23 1638)     Medical Decision Making  Amount and/or Complexity of Data Reviewed  Labs: ordered.    Risk  Prescription drug management.               ED Course as of 09/09/23 1713   Sat Sep 09, 2023   1713 All swabs negative, stable for discharge and follow up [SD]       ED Course User Index  [SD] Mina De La Cruz MD                    Clinical Impression:   Final diagnoses:  [J06.9] Upper respiratory tract infection, unspecified type (Primary)        ED Disposition Condition    Discharge Stable          ED Prescriptions       Medication Sig Dispense Start Date End Date Auth. Provider    predniSONE (DELTASONE) 20 MG tablet Take 2 tablets (40 mg total) by mouth once daily. for 5 days 10 tablet 9/9/2023 9/14/2023 Mina De La Cruz MD          Follow-up Information       Follow up With Specialties Details Why Contact Info Additional Information    Primary care physician  In 2 days       [facility] Department Emergency Medicine  As needed [address] [id number] [phone] Floor 1             Mina De La Cruz MD  09/09/23 1371

## 2023-09-14 ENCOUNTER — HOSPITAL ENCOUNTER (OUTPATIENT)
Dept: RADIOLOGY | Facility: HOSPITAL | Age: 73
Discharge: HOME | End: 2023-09-14
Attending: NURSE PRACTITIONER
Payer: MEDICARE

## 2023-09-14 DIAGNOSIS — R05.9 COUGH, UNSPECIFIED TYPE: ICD-10-CM

## 2023-09-14 PROCEDURE — 71046 X-RAY EXAM CHEST 2 VIEWS: CPT | Mod: TC

## 2023-10-02 PROBLEM — Z00.00 ROUTINE GENERAL MEDICAL EXAMINATION AT A HEALTH CARE FACILITY: Status: RESOLVED | Noted: 2023-07-03 | Resolved: 2023-10-02

## 2023-12-18 ENCOUNTER — LAB VISIT (OUTPATIENT)
Dept: LAB | Facility: HOSPITAL | Age: 73
End: 2023-12-18
Attending: INTERNAL MEDICINE
Payer: MEDICARE

## 2023-12-18 DIAGNOSIS — R73.01 IFG (IMPAIRED FASTING GLUCOSE): ICD-10-CM

## 2023-12-18 DIAGNOSIS — E63.0 ESSENTIAL FATTY ACID DEFICIENCY: ICD-10-CM

## 2023-12-18 DIAGNOSIS — I77.9 BILATERAL CAROTID ARTERY DISEASE, UNSPECIFIED TYPE: ICD-10-CM

## 2023-12-18 DIAGNOSIS — Z79.899 ENCOUNTER FOR LONG-TERM (CURRENT) USE OF OTHER MEDICATIONS: ICD-10-CM

## 2023-12-18 DIAGNOSIS — E78.5 HYPERLIPIDEMIA, UNSPECIFIED HYPERLIPIDEMIA TYPE: ICD-10-CM

## 2023-12-18 DIAGNOSIS — R03.0 ELEVATED BP WITHOUT DIAGNOSIS OF HYPERTENSION: ICD-10-CM

## 2023-12-18 DIAGNOSIS — E63.0 ESSENTIAL FATTY ACID (EFA) DEFICIENCY: ICD-10-CM

## 2023-12-18 DIAGNOSIS — E66.9 OBESITY, UNSPECIFIED CLASSIFICATION, UNSPECIFIED OBESITY TYPE, UNSPECIFIED WHETHER SERIOUS COMORBIDITY PRESENT: ICD-10-CM

## 2023-12-18 DIAGNOSIS — I65.23 BILATERAL CAROTID ARTERY OCCLUSION: ICD-10-CM

## 2023-12-18 DIAGNOSIS — E78.41 ELEVATED LIPOPROTEIN(A): ICD-10-CM

## 2023-12-18 LAB
ALBUMIN SERPL BCP-MCNC: 3.6 G/DL (ref 3.5–5.2)
ALBUMIN/CREAT UR: 4.8 UG/MG (ref 0–30)
ALP SERPL-CCNC: 79 U/L (ref 55–135)
ALT SERPL W/O P-5'-P-CCNC: 35 U/L (ref 10–44)
ANION GAP SERPL CALC-SCNC: 4 MMOL/L (ref 3–11)
AST SERPL-CCNC: 18 U/L (ref 10–40)
BASOPHILS # BLD AUTO: 0.09 K/UL (ref 0–0.2)
BASOPHILS NFR BLD: 1.3 % (ref 0–1.9)
BILIRUB SERPL-MCNC: 0.4 MG/DL (ref 0.1–1)
BUN SERPL-MCNC: 16 MG/DL (ref 8–23)
CALCIUM SERPL-MCNC: 9 MG/DL (ref 8.7–10.5)
CHLORIDE SERPL-SCNC: 107 MMOL/L (ref 95–110)
CHOLEST SERPL-MCNC: 171 MG/DL (ref 120–199)
CHOLEST/HDLC SERPL: 3.2 {RATIO} (ref 2–5)
CO2 SERPL-SCNC: 30 MMOL/L (ref 23–29)
CREAT SERPL-MCNC: 1.2 MG/DL (ref 0.5–1.4)
CREAT UR-MCNC: 170 MG/DL (ref 23–375)
DIFFERENTIAL METHOD: ABNORMAL
EOSINOPHIL # BLD AUTO: 0.3 K/UL (ref 0–0.5)
EOSINOPHIL NFR BLD: 4.3 % (ref 0–8)
ERYTHROCYTE [DISTWIDTH] IN BLOOD BY AUTOMATED COUNT: 13.1 % (ref 11.5–14.5)
EST. GFR  (NO RACE VARIABLE): >60 ML/MIN/1.73 M^2
ESTIMATED AVG GLUCOSE: 111 MG/DL (ref 68–131)
GLUCOSE SERPL-MCNC: 103 MG/DL (ref 70–110)
HBA1C MFR BLD: 5.5 % (ref 4–5.6)
HCT VFR BLD AUTO: 51.4 % (ref 40–54)
HDLC SERPL-MCNC: 54 MG/DL (ref 40–75)
HDLC SERPL: 31.6 % (ref 20–50)
HGB BLD-MCNC: 17.3 G/DL (ref 14–18)
IMM GRANULOCYTES # BLD AUTO: 0.04 K/UL (ref 0–0.04)
IMM GRANULOCYTES NFR BLD AUTO: 0.6 % (ref 0–0.5)
LDLC SERPL CALC-MCNC: 91.2 MG/DL (ref 63–159)
LYMPHOCYTES # BLD AUTO: 1.9 K/UL (ref 1–4.8)
LYMPHOCYTES NFR BLD: 27.2 % (ref 18–48)
MAGNESIUM SERPL-MCNC: 2.1 MG/DL (ref 1.6–2.6)
MCH RBC QN AUTO: 30.6 PG (ref 27–31)
MCHC RBC AUTO-ENTMCNC: 33.7 G/DL (ref 32–36)
MCV RBC AUTO: 91 FL (ref 82–98)
MICROALBUMIN UR DL<=1MG/L-MCNC: 8.2 MG/L
MONOCYTES # BLD AUTO: 0.7 K/UL (ref 0.3–1)
MONOCYTES NFR BLD: 10.5 % (ref 4–15)
NEUTROPHILS # BLD AUTO: 4 K/UL (ref 1.8–7.7)
NEUTROPHILS NFR BLD: 56.1 % (ref 38–73)
NONHDLC SERPL-MCNC: 117 MG/DL
NRBC BLD-RTO: 0 /100 WBC
PLATELET # BLD AUTO: 184 K/UL (ref 150–450)
PMV BLD AUTO: 12.2 FL (ref 9.2–12.9)
POTASSIUM SERPL-SCNC: 4.4 MMOL/L (ref 3.5–5.1)
PROT SERPL-MCNC: 7.5 G/DL (ref 6–8.4)
RBC # BLD AUTO: 5.65 M/UL (ref 4.6–6.2)
SODIUM SERPL-SCNC: 141 MMOL/L (ref 136–145)
TRIGL SERPL-MCNC: 129 MG/DL (ref 30–150)
WBC # BLD AUTO: 7.05 K/UL (ref 3.9–12.7)

## 2023-12-18 PROCEDURE — 83735 ASSAY OF MAGNESIUM: CPT | Performed by: INTERNAL MEDICINE

## 2023-12-18 PROCEDURE — 83036 HEMOGLOBIN GLYCOSYLATED A1C: CPT | Performed by: INTERNAL MEDICINE

## 2023-12-18 PROCEDURE — 85025 COMPLETE CBC W/AUTO DIFF WBC: CPT | Performed by: INTERNAL MEDICINE

## 2023-12-18 PROCEDURE — 82043 UR ALBUMIN QUANTITATIVE: CPT | Performed by: INTERNAL MEDICINE

## 2023-12-18 PROCEDURE — 80061 LIPID PANEL: CPT | Performed by: INTERNAL MEDICINE

## 2023-12-18 PROCEDURE — 36415 COLL VENOUS BLD VENIPUNCTURE: CPT | Performed by: INTERNAL MEDICINE

## 2023-12-18 PROCEDURE — 80053 COMPREHEN METABOLIC PANEL: CPT | Performed by: INTERNAL MEDICINE

## 2024-02-07 PROBLEM — R60.0 LOCALIZED EDEMA: Status: ACTIVE | Noted: 2024-02-07

## 2024-02-08 PROBLEM — R05.9 COUGH: Status: RESOLVED | Noted: 2021-08-26 | Resolved: 2024-02-08

## 2024-02-08 PROBLEM — E66.01 SEVERE OBESITY (BMI 35.0-39.9) WITH COMORBIDITY: Status: ACTIVE | Noted: 2021-09-10

## 2024-02-08 PROBLEM — H61.23 BILATERAL IMPACTED CERUMEN: Status: RESOLVED | Noted: 2023-03-06 | Resolved: 2024-02-08

## 2024-02-08 PROBLEM — J06.9 VIRAL UPPER RESPIRATORY TRACT INFECTION: Status: RESOLVED | Noted: 2023-07-26 | Resolved: 2024-02-08

## 2024-02-08 PROBLEM — F51.01 PRIMARY INSOMNIA: Status: ACTIVE | Noted: 2024-02-08

## 2024-02-28 PROBLEM — C7A.090 MALIGNANT CARCINOID TUMOR OF LUNG: Status: ACTIVE | Noted: 2021-08-26

## 2024-06-03 PROBLEM — R20.0 HAND NUMBNESS: Status: ACTIVE | Noted: 2024-06-03

## 2024-06-04 ENCOUNTER — HOSPITAL ENCOUNTER (OUTPATIENT)
Dept: RADIOLOGY | Facility: HOSPITAL | Age: 74
Discharge: HOME | End: 2024-06-04
Attending: NURSE PRACTITIONER
Payer: MEDICARE

## 2024-06-04 DIAGNOSIS — M54.2 CERVICALGIA: ICD-10-CM

## 2024-06-04 PROBLEM — R22.1 LUMP IN NECK: Status: ACTIVE | Noted: 2024-06-04

## 2024-06-04 PROCEDURE — 72050 X-RAY EXAM NECK SPINE 4/5VWS: CPT | Mod: TC

## 2024-06-16 ENCOUNTER — HOSPITAL ENCOUNTER (EMERGENCY)
Facility: HOSPITAL | Age: 74
Discharge: HOME | End: 2024-06-16
Attending: EMERGENCY MEDICINE
Payer: MEDICARE

## 2024-06-16 VITALS
HEIGHT: 67 IN | SYSTOLIC BLOOD PRESSURE: 105 MMHG | OXYGEN SATURATION: 99 % | BODY MASS INDEX: 33.9 KG/M2 | HEART RATE: 65 BPM | RESPIRATION RATE: 16 BRPM | DIASTOLIC BLOOD PRESSURE: 52 MMHG | WEIGHT: 216 LBS | TEMPERATURE: 98 F

## 2024-06-16 DIAGNOSIS — R07.9 CHEST PAIN, UNSPECIFIED TYPE: Primary | ICD-10-CM

## 2024-06-16 DIAGNOSIS — R07.9 CHEST PAIN: ICD-10-CM

## 2024-06-16 LAB
ALBUMIN SERPL BCP-MCNC: 3.7 G/DL (ref 3.5–5.2)
ALP SERPL-CCNC: 82 U/L (ref 55–135)
ALT SERPL W/O P-5'-P-CCNC: 31 U/L (ref 10–44)
ANION GAP SERPL CALC-SCNC: 10 MMOL/L (ref 3–11)
AST SERPL-CCNC: 32 U/L (ref 10–40)
BASOPHILS # BLD AUTO: 0.06 K/UL (ref 0–0.2)
BASOPHILS NFR BLD: 0.8 % (ref 0–1.9)
BILIRUB SERPL-MCNC: 0.8 MG/DL (ref 0.1–1)
BUN SERPL-MCNC: 21 MG/DL (ref 8–23)
CALCIUM SERPL-MCNC: 8.9 MG/DL (ref 8.7–10.5)
CHLORIDE SERPL-SCNC: 102 MMOL/L (ref 95–110)
CO2 SERPL-SCNC: 25 MMOL/L (ref 23–29)
CREAT SERPL-MCNC: 1.1 MG/DL (ref 0.5–1.4)
DIFFERENTIAL METHOD BLD: NORMAL
EOSINOPHIL # BLD AUTO: 0.3 K/UL (ref 0–0.5)
EOSINOPHIL NFR BLD: 4.4 % (ref 0–8)
ERYTHROCYTE [DISTWIDTH] IN BLOOD BY AUTOMATED COUNT: 13.6 % (ref 11.5–14.5)
EST. GFR  (NO RACE VARIABLE): >60 ML/MIN/1.73 M^2
GLUCOSE SERPL-MCNC: 90 MG/DL (ref 70–110)
HCT VFR BLD AUTO: 50.2 % (ref 40–54)
HGB BLD-MCNC: 17.2 G/DL (ref 14–18)
IMM GRANULOCYTES # BLD AUTO: 0.03 K/UL (ref 0–0.04)
IMM GRANULOCYTES NFR BLD AUTO: 0.4 % (ref 0–0.5)
LYMPHOCYTES # BLD AUTO: 2.5 K/UL (ref 1–4.8)
LYMPHOCYTES NFR BLD: 33.5 % (ref 18–48)
MCH RBC QN AUTO: 30 PG (ref 27–31)
MCHC RBC AUTO-ENTMCNC: 34.3 G/DL (ref 32–36)
MCV RBC AUTO: 88 FL (ref 82–98)
MONOCYTES # BLD AUTO: 1 K/UL (ref 0.3–1)
MONOCYTES NFR BLD: 13.5 % (ref 4–15)
NEUTROPHILS # BLD AUTO: 3.5 K/UL (ref 1.8–7.7)
NEUTROPHILS NFR BLD: 47.4 % (ref 38–73)
NRBC BLD-RTO: 0 /100 WBC
NT-PROBNP SERPL-MCNC: 33 PG/ML (ref 5–900)
OHS QRS DURATION: 92 MS
OHS QTC CALCULATION: 436 MS
PLATELET # BLD AUTO: 195 K/UL (ref 150–450)
PMV BLD AUTO: 11.2 FL (ref 9.2–12.9)
POTASSIUM SERPL-SCNC: 3.9 MMOL/L (ref 3.5–5.1)
PROT SERPL-MCNC: 7.3 G/DL (ref 6–8.4)
RBC # BLD AUTO: 5.73 M/UL (ref 4.6–6.2)
SODIUM SERPL-SCNC: 137 MMOL/L (ref 136–145)
TROPONIN I SERPL DL<=0.01 NG/ML-MCNC: 10.5 PG/ML (ref 0–60)
TROPONIN I SERPL DL<=0.01 NG/ML-MCNC: 11.1 PG/ML (ref 0–60)
TROPONIN I SERPL DL<=0.01 NG/ML-MCNC: 11.8 PG/ML (ref 0–60)
WBC # BLD AUTO: 7.34 K/UL (ref 3.9–12.7)

## 2024-06-16 PROCEDURE — 36415 COLL VENOUS BLD VENIPUNCTURE: CPT | Mod: XB | Performed by: EMERGENCY MEDICINE

## 2024-06-16 PROCEDURE — 83880 ASSAY OF NATRIURETIC PEPTIDE: CPT | Performed by: EMERGENCY MEDICINE

## 2024-06-16 PROCEDURE — 80053 COMPREHEN METABOLIC PANEL: CPT | Performed by: EMERGENCY MEDICINE

## 2024-06-16 PROCEDURE — 99285 EMERGENCY DEPT VISIT HI MDM: CPT | Mod: 25

## 2024-06-16 PROCEDURE — 25000003 PHARM REV CODE 250: Performed by: EMERGENCY MEDICINE

## 2024-06-16 PROCEDURE — 93010 ELECTROCARDIOGRAM REPORT: CPT | Mod: ,,, | Performed by: INTERNAL MEDICINE

## 2024-06-16 PROCEDURE — 84484 ASSAY OF TROPONIN QUANT: CPT | Mod: 91 | Performed by: EMERGENCY MEDICINE

## 2024-06-16 PROCEDURE — 85025 COMPLETE CBC W/AUTO DIFF WBC: CPT | Performed by: EMERGENCY MEDICINE

## 2024-06-16 PROCEDURE — 93005 ELECTROCARDIOGRAM TRACING: CPT

## 2024-06-16 RX ORDER — ASPIRIN 325 MG
325 TABLET ORAL
Status: COMPLETED | OUTPATIENT
Start: 2024-06-16 | End: 2024-06-16

## 2024-06-16 RX ADMIN — ASPIRIN 325 MG ORAL TABLET 325 MG: 325 PILL ORAL at 12:06

## 2024-06-16 RX ADMIN — NITROGLYCERIN 1 INCH: 20 OINTMENT TOPICAL at 12:06

## 2024-06-16 NOTE — DISCHARGE INSTRUCTIONS
Return to the emergency department for re-evaluation if your chest pain symptoms return or worsen.

## 2024-06-16 NOTE — ED PROVIDER NOTES
Encounter Date: 6/16/2024       History     Chief Complaint   Patient presents with    Chest Pain     Pt to the ER w/ complaints of chest pain and dizziness, states he woke up w/ symptoms tonight after over exerting himself doing yard work throughout the day. Denies cardiac hx.     74-year-old male presents to the emergency department for evaluation due to chest tightness/chest pressure.  Onset just prior to ED evaluation.  No provocation.  No palliation.  The patient also complains of mild lightheadedness.  No headache.  No palpitations.  The patient is awake, alert, and oriented to person, place, time, and situation.  No focal deficits.  Cranial nerves 2-12 are grossly intact bilaterally.  GCS 15.  Patient reports onset of symptoms after over exerting himself doing yd work in the extreme heat today.        Review of patient's allergies indicates:  No Known Allergies  Past Medical History:   Diagnosis Date    Acid reflux     Cancer     prostate cancer    Disc disorder     Diverticulitis     Diverticulitis 08/2020    Hyperlipidemia     Kidney stone     Neuropathy     Obesity     BHASKAR (obstructive sleep apnea)     machine used    PAD (peripheral artery disease)     Sinusitis     Vitamin D deficiency      Past Surgical History:   Procedure Laterality Date    COLON SURGERY      resection    CYSTOSCOPY W/ RETROGRADES Bilateral 7/5/2023    Procedure: CYSTOSCOPY, WITH RETROGRADE PYELOGRAM;  Surgeon: Stew Bernard MD;  Location: [facility] OR;  Service: Urology;  Laterality: Bilateral;  7am per Vera    CYSTOSCOPY W/ RETROGRADES Bilateral 7/20/2023    Procedure: CYSTOSCOPY, WITH RETROGRADE PYELOGRAM;  Surgeon: Stew Bernard MD;  Location: [facility] OR;  Service: Urology;  Laterality: Bilateral;    EXTRACORPOREAL SHOCK WAVE LITHOTRIPSY Right 7/20/2023    Procedure: LITHOTRIPSY, ESWL;  Surgeon: Stew Bernard MD;  Location: [facility] OR;  Service: Urology;  Laterality: Right;    EXTRACTION - STONE Bilateral 7/5/2023     Procedure: EXTRACTION - STONE;  Surgeon: Stew Bernard MD;  Location: [facility] OR;  Service: Urology;  Laterality: Bilateral;    LASER LITHOTRIPSY Left 7/5/2023    Procedure: LITHOTRIPSY, USING LASER;  Surgeon: Stew eBrnard MD;  Location: [facility] OR;  Service: Urology;  Laterality: Left;    LASER LITHOTRIPSY Right 7/20/2023    Procedure: LITHOTRIPSY, USING LASER;  Surgeon: Stew Bernard MD;  Location: [facility] OR;  Service: Urology;  Laterality: Right;    PROSTATECTOMY      REMOVAL, STENT, URETER Bilateral 7/20/2023    Procedure: REMOVAL, STENT, URETER;  Surgeon: Stew Bernard MD;  Location: [facility] OR;  Service: Urology;  Laterality: Bilateral;    URETERAL STENT PLACEMENT Bilateral 7/5/2023    Procedure: INSERTION, STENT, URETER;  Surgeon: Stew Bernard MD;  Location: [facility] OR;  Service: Urology;  Laterality: Bilateral;    URETEROSCOPY Left 7/5/2023    Procedure: URETEROSCOPY;  Surgeon: Stew Bernard MD;  Location: [facility] OR;  Service: Urology;  Laterality: Left;    URETEROSCOPY Right 7/20/2023    Procedure: URETEROSCOPY;  Surgeon: Stew Bernard MD;  Location: [facility] OR;  Service: Urology;  Laterality: Right;     Family History   Problem Relation Name Age of Onset    Cancer Father      Prostate cancer Father      Hypertension Father      Cancer Mother      Breast cancer Mother      Hypertension Mother      Thyroid cancer Son       Social History     Tobacco Use    Smoking status: Former     Types: Cigarettes    Smokeless tobacco: Never    Tobacco comments:     Quit 40 yrs ago   Substance Use Topics    Alcohol use: No     Alcohol/week: 0.0 standard drinks of alcohol    Drug use: No     Review of Systems   Cardiovascular:  Positive for chest pain.   Neurological:  Positive for dizziness.   All other systems reviewed and are negative.      Physical Exam     Initial Vitals [06/16/24 0041]   BP Pulse Resp Temp SpO2   (!) 172/79 70 16 97.5 °F (36.4 °C) 97 %      MAP       --         Physical  Exam    Nursing note and vitals reviewed.  Constitutional: He appears well-developed.   HENT:   Head: Normocephalic and atraumatic.   Eyes: EOM are normal. Pupils are equal, round, and reactive to light.   Neck:   Normal range of motion.  Cardiovascular:  Normal rate, regular rhythm and normal heart sounds.           Pulmonary/Chest: Breath sounds normal.   Abdominal: Abdomen is soft. Bowel sounds are normal. He exhibits no distension. There is no abdominal tenderness. There is no rebound.   Musculoskeletal:         General: Normal range of motion.      Cervical back: Normal range of motion.     Neurological: He is alert and oriented to person, place, and time. He has normal strength. GCS score is 15.   Skin: Skin is warm and dry.   Psychiatric: He has a normal mood and affect. His mood appears not anxious.         ED Course   Procedures  Labs Reviewed   CBC W/ AUTO DIFFERENTIAL   COMPREHENSIVE METABOLIC PANEL   TROPONIN I HIGH SENSITIVITY   NT-PRO NATRIURETIC PEPTIDE   TROPONIN I HIGH SENSITIVITY   TROPONIN I HIGH SENSITIVITY     EKG Readings: (Independently Interpreted)   Initial Reading: No STEMI. Rhythm: Normal Sinus Rhythm. Heart Rate: 73. Ectopy: No Ectopy. ST Segments: Normal ST Segments. T Waves: Normal. Clinical Impression: Normal Sinus Rhythm       Imaging Results              X-Ray Chest AP Portable (Preliminary result)  Result time 06/16/24 05:11:02      Wet Read by Reid Sage DO (06/16/24 05:11:02, [facility] Emergency Department, Emergency Medicine)    Unremarkable chest x-ray.  No pneumothorax.  No consolidation.                                     Medications   aspirin tablet 325 mg (325 mg Oral Given 6/16/24 0053)   nitroGLYCERIN 2% TD oint ointment 1 inch (1 inch Topical (Top) Given 6/16/24 0053)     Medical Decision Making  Amount and/or Complexity of Data Reviewed  Labs: ordered.  Radiology: ordered and independent interpretation performed.    Risk  OTC drugs.  Prescription drug  management.               ED Course as of 06/16/24 0516   [place] Jun 16, 2024   4938 No acute distress.  Chest pain resolved.  Dizziness resolved.  Normal sinus EKG.  Normal troponin x3.  Unremarkable chemistries.  Unremarkable CBC.  Unremarkable chest x-ray.  Acute coronary syndrome is an unlikely cause of this patient's chest pain.  Chest pain resolved.  Dizziness resolved.  No headache.  No palpitations.  The patient is awake, alert, and oriented to person, place, time, and situation.  No focal deficits.  Cranial nerves 2-12 are grossly intact bilaterally.  GCS 15.  Vital signs stable.  Afebrile.  Discharge home. [DH]      ED Course User Index  [DH] Reid Sage DO                             Clinical Impression:  Final diagnoses:  [R07.9] Chest pain  [R07.9] Chest pain, unspecified type (Primary)          ED Disposition Condition    Discharge Stable          ED Prescriptions    None       Follow-up Information       Follow up With Specialties Details Why Contact Info    Oni Travis Jr., MD Internal Medicine In 3 days  [address] [place] [id number]  [phone]               Reid Sage DO  06/16/24 0516

## 2024-08-14 PROBLEM — J30.1 NON-SEASONAL ALLERGIC RHINITIS DUE TO POLLEN: Status: ACTIVE | Noted: 2024-08-14

## 2024-09-17 PROBLEM — H61.23 BILATERAL IMPACTED CERUMEN: Status: RESOLVED | Noted: 2023-03-06 | Resolved: 2024-09-17

## 2024-11-05 ENCOUNTER — PATIENT MESSAGE (OUTPATIENT)
Dept: RESEARCH | Facility: HOSPITAL | Age: 74
End: 2024-11-05
Payer: MEDICARE

## 2024-11-24 ENCOUNTER — HOSPITAL ENCOUNTER (EMERGENCY)
Facility: HOSPITAL | Age: 74
Discharge: HOME | End: 2024-11-24
Attending: STUDENT IN AN ORGANIZED HEALTH CARE EDUCATION/TRAINING PROGRAM
Payer: MEDICARE

## 2024-11-24 VITALS
SYSTOLIC BLOOD PRESSURE: 133 MMHG | BODY MASS INDEX: 32.14 KG/M2 | TEMPERATURE: 98 F | HEART RATE: 75 BPM | WEIGHT: 217 LBS | OXYGEN SATURATION: 96 % | RESPIRATION RATE: 20 BRPM | DIASTOLIC BLOOD PRESSURE: 83 MMHG | HEIGHT: 69 IN

## 2024-11-24 DIAGNOSIS — J10.1 INFLUENZA A: Primary | ICD-10-CM

## 2024-11-24 LAB
CTP QC/QA: YES
CTP QC/QA: YES
POC MOLECULAR INFLUENZA A AGN: POSITIVE
POC MOLECULAR INFLUENZA B AGN: NEGATIVE
SARS-COV-2 RDRP RESP QL NAA+PROBE: NEGATIVE

## 2024-11-24 PROCEDURE — 99284 EMERGENCY DEPT VISIT MOD MDM: CPT

## 2024-11-24 PROCEDURE — 87502 INFLUENZA DNA AMP PROBE: CPT

## 2024-11-24 PROCEDURE — 87635 SARS-COV-2 COVID-19 AMP PRB: CPT | Performed by: NURSE PRACTITIONER

## 2024-11-24 RX ORDER — OSELTAMIVIR PHOSPHATE 75 MG/1
75 CAPSULE ORAL 2 TIMES DAILY
Qty: 10 CAPSULE | Refills: 0 | Status: SHIPPED | OUTPATIENT
Start: 2024-11-24 | End: 2024-11-29

## 2024-11-24 RX ORDER — FLUTICASONE PROPIONATE 50 MCG
1 SPRAY, SUSPENSION (ML) NASAL 2 TIMES DAILY PRN
Qty: 15 G | Refills: 0 | Status: SHIPPED | OUTPATIENT
Start: 2024-11-24

## 2024-11-24 RX ORDER — CETIRIZINE HYDROCHLORIDE 10 MG/1
10 TABLET ORAL DAILY
Qty: 10 TABLET | Refills: 0 | Status: SHIPPED | OUTPATIENT
Start: 2024-11-24 | End: 2024-12-04

## 2024-11-24 RX ORDER — PROMETHAZINE HYDROCHLORIDE AND DEXTROMETHORPHAN HYDROBROMIDE 6.25; 15 MG/5ML; MG/5ML
5 SYRUP ORAL EVERY 4 HOURS PRN
Qty: 118 ML | Refills: 0 | Status: SHIPPED | OUTPATIENT
Start: 2024-11-24 | End: 2024-12-04

## 2024-11-24 NOTE — ED PROVIDER NOTES
"Encounter Date: 11/24/2024       History     Chief Complaint   Patient presents with    Headache     Headache, chills, cough, body aches, "lot of sneezing" onset yesterday, took nyquil/daquil with no relief.      This is a 74-year-old white male with multiple medical problems including right lung resection who presents to the emergency department with complaints of URI signs and symptoms over the last 2 days, characterized by headache, stuffy/runny nose, sore throat, nonproductive cough, and body aches.  He denies vomiting or diarrhea.      Review of patient's allergies indicates:  No Known Allergies  Past Medical History:   Diagnosis Date    Acid reflux     Cancer     prostate cancer    Disc disorder     Diverticulitis     Diverticulitis 08/2020    Hyperlipidemia     Kidney stone     Neuropathy     Obesity     BHASKAR (obstructive sleep apnea)     machine used    PAD (peripheral artery disease)     Sinusitis     Vitamin D deficiency      Past Surgical History:   Procedure Laterality Date    COLON SURGERY      resection    CYSTOSCOPY W/ RETROGRADES Bilateral 7/5/2023    Procedure: CYSTOSCOPY, WITH RETROGRADE PYELOGRAM;  Surgeon: Stew Bernard MD;  Location: [facility] OR;  Service: Urology;  Laterality: Bilateral;  7am per Vera    CYSTOSCOPY W/ RETROGRADES Bilateral 7/20/2023    Procedure: CYSTOSCOPY, WITH RETROGRADE PYELOGRAM;  Surgeon: Stew Bernard MD;  Location: [facility] OR;  Service: Urology;  Laterality: Bilateral;    EXTRACORPOREAL SHOCK WAVE LITHOTRIPSY Right 7/20/2023    Procedure: LITHOTRIPSY, ESWL;  Surgeon: Stew Bernard MD;  Location: [facility] OR;  Service: Urology;  Laterality: Right;    EXTRACTION - STONE Bilateral 7/5/2023    Procedure: EXTRACTION - STONE;  Surgeon: Stew Bernard MD;  Location: [facility] OR;  Service: Urology;  Laterality: Bilateral;    LASER LITHOTRIPSY Left 7/5/2023    Procedure: LITHOTRIPSY, USING LASER;  Surgeon: Stew Bernard MD;  Location: [facility] OR;  Service: Urology;  " Laterality: Left;    LASER LITHOTRIPSY Right 7/20/2023    Procedure: LITHOTRIPSY, USING LASER;  Surgeon: Stew Bernard MD;  Location: [facility] OR;  Service: Urology;  Laterality: Right;    PROSTATECTOMY      REMOVAL, STENT, URETER Bilateral 7/20/2023    Procedure: REMOVAL, STENT, URETER;  Surgeon: Stew Bernard MD;  Location: [facility] OR;  Service: Urology;  Laterality: Bilateral;    URETERAL STENT PLACEMENT Bilateral 7/5/2023    Procedure: INSERTION, STENT, URETER;  Surgeon: Stew Bernard MD;  Location: [facility] OR;  Service: Urology;  Laterality: Bilateral;    URETEROSCOPY Left 7/5/2023    Procedure: URETEROSCOPY;  Surgeon: Stew Bernard MD;  Location: [facility] OR;  Service: Urology;  Laterality: Left;    URETEROSCOPY Right 7/20/2023    Procedure: URETEROSCOPY;  Surgeon: Stew Bernard MD;  Location: [facility] OR;  Service: Urology;  Laterality: Right;     Family History   Problem Relation Name Age of Onset    Cancer Father      Prostate cancer Father      Hypertension Father      Cancer Mother      Breast cancer Mother      Hypertension Mother      Thyroid cancer Son       Social History     Tobacco Use    Smoking status: Former     Types: Cigarettes    Smokeless tobacco: Never    Tobacco comments:     Quit 40 yrs ago   Substance Use Topics    Alcohol use: No     Alcohol/week: 0.0 standard drinks of alcohol    Drug use: No     Review of Systems   Constitutional:  Positive for chills. Negative for fever.   HENT:  Positive for congestion, rhinorrhea and sore throat.    Respiratory:  Positive for cough.    Gastrointestinal:  Negative for diarrhea and vomiting.   Musculoskeletal:  Positive for myalgias.       Physical Exam     Initial Vitals [11/24/24 1318]   BP Pulse Resp Temp SpO2   133/83 75 20 98 °F (36.7 °C) 96 %      MAP       --         Physical Exam    Nursing note and vitals reviewed.  Constitutional: He appears well-developed and well-nourished. He is active. No distress.   HENT:   Head:  Normocephalic and atraumatic.   Mild posterior pharyngeal erythema   Eyes: EOM are normal. Pupils are equal, round, and reactive to light.   Neck: Neck supple.   Normal range of motion.  Cardiovascular:  Normal rate, regular rhythm and normal heart sounds.           Pulmonary/Chest: Breath sounds normal. No respiratory distress.   Musculoskeletal:         General: Normal range of motion.      Cervical back: Normal range of motion and neck supple.     Neurological: He is alert and oriented to person, place, and time. GCS score is 15. GCS eye subscore is 4. GCS verbal subscore is 5. GCS motor subscore is 6.   Skin: Skin is warm and dry. Capillary refill takes less than 2 seconds.   Psychiatric: He has a normal mood and affect. His behavior is normal. Thought content normal.         ED Course   Procedures  Labs Reviewed   POCT INFLUENZA A/B MOLECULAR - Abnormal       Result Value    POC Molecular Influenza A Ag Positive (*)     POC Molecular Influenza B Ag Negative       Acceptable Yes     SARS-COV-2 RDRP GENE    POC Rapid COVID Negative       Acceptable Yes      Narrative:     .This test utilizes isothermal nucleic acid amplification technology to detect the SARS-CoV-2 RdRp nucleic acid segment. The analytical sensitivity (limit of detection) is 500 copies/swab.     A POSITIVE result is indicative of the presence of SARS-CoV-2 RNA; clinical correlation with patient history and other diagnostic information is necessary to determine patient infection status.    A NEGATIVE result means that SARS-CoV-2 nucleic acids are not present above the limit of detection. A NEGATIVE result should be treated as presumptive. It does not rule out the possibility of COVID-19 and should not be the sole basis for treatment decisions. If COVID-19 is strongly suspected based on clinical and exposure history, re-testing using an alternate molecular assay should be considered.     Commercial kits are provided by  [employer].               Imaging Results    None          Medications - No data to display  Medical Decision Making             ED Course as of 11/24/24 1352   Sun Nov 24, 2024   1351 Rapid COVID-19 negative, influenza a positive, influenza B negative [CB]      ED Course User Index  [CB] Hui Marshall NP                           Clinical Impression:  Final diagnoses:  [J10.1] Influenza A (Primary)          ED Disposition Condition    Discharge Stable          ED Prescriptions       Medication Sig Dispense Start Date End Date Auth. Provider    oseltamivir (TAMIFLU) 75 MG capsule Take 1 capsule (75 mg total) by mouth 2 (two) times daily. for 5 days 10 capsule 11/24/2024 11/29/2024 Hui Marshall NP    promethazine-dextromethorphan (PROMETHAZINE-DM) 6.25-15 mg/5 mL Syrp Take 5 mLs by mouth every 4 (four) hours as needed. 118 mL 11/24/2024 12/4/2024 Hui Marshall NP    cetirizine (ZYRTEC) 10 MG tablet Take 1 tablet (10 mg total) by mouth once daily. for 10 days 10 tablet 11/24/2024 12/4/2024 Hui Marshall NP    fluticasone propionate (FLONASE) 50 mcg/actuation nasal spray 1 spray (50 mcg total) by Each Nostril route 2 (two) times daily as needed. 15 g 11/24/2024 -- Hui Marshall NP          Follow-up Information       Follow up With Specialties Details Why Contact Info    PCP Follow UP  Schedule an appointment as soon as possible for a visit in 2 days for follow-up, for re-evaluation of today's complaint              Hui Marshall NP  11/24/24 3849

## 2024-12-30 ENCOUNTER — HOSPITAL ENCOUNTER (OUTPATIENT)
Dept: PULMONOLOGY | Facility: HOSPITAL | Age: 74
Discharge: HOME | End: 2024-12-30
Attending: FAMILY MEDICINE
Payer: MEDICARE

## 2024-12-30 ENCOUNTER — HOSPITAL ENCOUNTER (OUTPATIENT)
Dept: RADIOLOGY | Facility: HOSPITAL | Age: 74
Discharge: HOME | End: 2024-12-30
Attending: FAMILY MEDICINE
Payer: MEDICARE

## 2024-12-30 DIAGNOSIS — R06.02 SOB (SHORTNESS OF BREATH): ICD-10-CM

## 2024-12-30 DIAGNOSIS — R93.89 ABNORMAL CHEST X-RAY: ICD-10-CM

## 2024-12-30 LAB
OHS QRS DURATION: 96 MS
OHS QTC CALCULATION: 418 MS

## 2024-12-30 PROCEDURE — 93005 ELECTROCARDIOGRAM TRACING: CPT

## 2024-12-30 PROCEDURE — 71046 X-RAY EXAM CHEST 2 VIEWS: CPT | Mod: TC

## 2024-12-30 PROCEDURE — 25500020 PHARM REV CODE 255: Performed by: FAMILY MEDICINE

## 2024-12-30 PROCEDURE — 93010 ELECTROCARDIOGRAM REPORT: CPT | Mod: ,,, | Performed by: INTERNAL MEDICINE

## 2024-12-30 PROCEDURE — 71260 CT THORAX DX C+: CPT | Mod: TC

## 2024-12-30 RX ADMIN — IOHEXOL 80 ML: 350 INJECTION, SOLUTION INTRAVENOUS at 02:12

## 2024-12-30 NOTE — PROGRESS NOTES
Called and left message for the patient to call back .Alice Corrigan RN     Please let patient know his CXR is suspicious for pneumonia, however, the radiologist is recommending follow up imaging. I want to get a better look with a CT of his chest so we know for sure what we are dealing with. Especially with his history of lung cancer.   Please place orders for STAT CT chest with IV contrast at [facility]. Also cmp. Thanks

## 2024-12-30 NOTE — PROGRESS NOTES
Please let Mr. Adkins know his CT scan did confirm a pneumonia in his right middle lobe.   We will go ahead and treat him with a course of antibiotics and steroids. I am sending both to the pharmacy.     We will need to do a follow up non contrast CT chest after he has completed his therapy. Thanks

## 2025-01-13 PROBLEM — H61.21 IMPACTED CERUMEN OF RIGHT EAR: Status: ACTIVE | Noted: 2023-03-06

## 2025-01-13 PROBLEM — J84.10 PULMONARY FIBROSIS, UNSPECIFIED: Status: ACTIVE | Noted: 2025-01-13

## 2025-01-14 ENCOUNTER — HOSPITAL ENCOUNTER (OUTPATIENT)
Dept: RADIOLOGY | Facility: HOSPITAL | Age: 75
Discharge: HOME | End: 2025-01-14
Attending: INTERNAL MEDICINE
Payer: MEDICARE

## 2025-01-14 DIAGNOSIS — J18.9 PNEUMONIA OF RIGHT MIDDLE LOBE DUE TO INFECTIOUS ORGANISM: ICD-10-CM

## 2025-01-14 PROCEDURE — 71250 CT THORAX DX C-: CPT | Mod: TC

## 2025-01-14 NOTE — PROGRESS NOTES
Please let patient know his repeat CT scan looks okay, the area of concern appears stable from last scan and the radiologist is suspecting scarring. We will plan to repeat his Ct scan in 6 months for follow up. You can place those orders now.     Please forward all scans and office note to his oncologist and pulmonologist. Thanks

## 2025-03-20 PROBLEM — K59.01 SLOW TRANSIT CONSTIPATION: Status: ACTIVE | Noted: 2025-03-20

## 2025-06-03 ENCOUNTER — HOSPITAL ENCOUNTER (EMERGENCY)
Facility: HOSPITAL | Age: 75
Discharge: HOME | End: 2025-06-03
Attending: EMERGENCY MEDICINE
Payer: MEDICARE

## 2025-06-03 VITALS
SYSTOLIC BLOOD PRESSURE: 153 MMHG | HEART RATE: 63 BPM | DIASTOLIC BLOOD PRESSURE: 72 MMHG | OXYGEN SATURATION: 98 % | WEIGHT: 222.25 LBS | HEIGHT: 69 IN | TEMPERATURE: 98 F | BODY MASS INDEX: 32.92 KG/M2 | RESPIRATION RATE: 16 BRPM

## 2025-06-03 DIAGNOSIS — N20.0 RIGHT KIDNEY STONE: Primary | ICD-10-CM

## 2025-06-03 LAB
ABSOLUTE EOSINOPHIL (OHS): 0.11 K/UL
ABSOLUTE MONOCYTE (OHS): 0.76 K/UL (ref 0.3–1)
ABSOLUTE NEUTROPHIL COUNT (OHS): 9.51 K/UL (ref 1.8–7.7)
ALBUMIN SERPL BCP-MCNC: 4.2 G/DL (ref 3.5–5.2)
ALP SERPL-CCNC: 63 UNIT/L (ref 40–150)
ALT SERPL W/O P-5'-P-CCNC: 23 UNIT/L (ref 10–44)
ANION GAP (OHS): 8 MMOL/L (ref 8–16)
AST SERPL-CCNC: 25 UNIT/L (ref 11–45)
BACTERIA #/AREA URNS AUTO: ABNORMAL /HPF
BASOPHILS # BLD AUTO: 0.06 K/UL
BASOPHILS NFR BLD AUTO: 0.5 %
BILIRUB SERPL-MCNC: 0.8 MG/DL (ref 0.1–1)
BILIRUB UR QL STRIP.AUTO: NEGATIVE
BUN SERPL-MCNC: 20 MG/DL (ref 8–23)
CALCIUM SERPL-MCNC: 9.1 MG/DL (ref 8.7–10.5)
CHLORIDE SERPL-SCNC: 106 MMOL/L (ref 95–110)
CLARITY UR: ABNORMAL
CO2 SERPL-SCNC: 26 MMOL/L (ref 23–29)
COLOR UR AUTO: YELLOW
CREAT SERPL-MCNC: 1.4 MG/DL (ref 0.5–1.4)
ERYTHROCYTE [DISTWIDTH] IN BLOOD BY AUTOMATED COUNT: 13 % (ref 11.5–14.5)
GFR SERPLBLD CREATININE-BSD FMLA CKD-EPI: 53 ML/MIN/1.73/M2
GLUCOSE SERPL-MCNC: 132 MG/DL (ref 70–110)
GLUCOSE UR QL STRIP: NEGATIVE
HCT VFR BLD AUTO: 49.7 % (ref 40–54)
HGB BLD-MCNC: 16.6 GM/DL (ref 14–18)
HGB UR QL STRIP: ABNORMAL
HOLD SPECIMEN: NORMAL
HYALINE CASTS UR QL AUTO: 1 /LPF (ref 0–1)
IMM GRANULOCYTES # BLD AUTO: 0.05 K/UL (ref 0–0.04)
IMM GRANULOCYTES NFR BLD AUTO: 0.4 % (ref 0–0.5)
KETONES UR QL STRIP: ABNORMAL
LEUKOCYTE ESTERASE UR QL STRIP: ABNORMAL
LIPASE SERPL-CCNC: 28 U/L (ref 4–60)
LYMPHOCYTES # BLD AUTO: 1.08 K/UL (ref 1–4.8)
MCH RBC QN AUTO: 30.3 PG (ref 27–31)
MCHC RBC AUTO-ENTMCNC: 33.4 G/DL (ref 32–36)
MCV RBC AUTO: 91 FL (ref 82–98)
MICROSCOPIC COMMENT: ABNORMAL
NITRITE UR QL STRIP: NEGATIVE
NUCLEATED RBC (/100WBC) (OHS): 0 /100 WBC
PH UR STRIP: 6 [PH]
PLATELET # BLD AUTO: 218 K/UL (ref 150–450)
PMV BLD AUTO: 11 FL (ref 9.2–12.9)
POTASSIUM SERPL-SCNC: 4.3 MMOL/L (ref 3.5–5.1)
PROT SERPL-MCNC: 7.2 GM/DL (ref 6–8.4)
PROT UR QL STRIP: ABNORMAL
RBC # BLD AUTO: 5.48 M/UL (ref 4.6–6.2)
RBC #/AREA URNS AUTO: >100 /HPF (ref 0–4)
RELATIVE EOSINOPHIL (OHS): 1 %
RELATIVE LYMPHOCYTE (OHS): 9.3 % (ref 18–48)
RELATIVE MONOCYTE (OHS): 6.6 % (ref 4–15)
RELATIVE NEUTROPHIL (OHS): 82.2 % (ref 38–73)
SODIUM SERPL-SCNC: 140 MMOL/L (ref 136–145)
SP GR UR STRIP: 1.01
SQUAMOUS #/AREA URNS AUTO: 0 /HPF
UROBILINOGEN UR STRIP-ACNC: 1 EU/DL
WBC # BLD AUTO: 11.57 K/UL (ref 3.9–12.7)
WBC #/AREA URNS AUTO: 18 /HPF (ref 0–5)

## 2025-06-03 PROCEDURE — 85025 COMPLETE CBC W/AUTO DIFF WBC: CPT | Performed by: EMERGENCY MEDICINE

## 2025-06-03 PROCEDURE — 80053 COMPREHEN METABOLIC PANEL: CPT | Performed by: EMERGENCY MEDICINE

## 2025-06-03 PROCEDURE — 99285 EMERGENCY DEPT VISIT HI MDM: CPT | Mod: 25

## 2025-06-03 PROCEDURE — 36415 COLL VENOUS BLD VENIPUNCTURE: CPT | Performed by: EMERGENCY MEDICINE

## 2025-06-03 PROCEDURE — 81003 URINALYSIS AUTO W/O SCOPE: CPT | Performed by: EMERGENCY MEDICINE

## 2025-06-03 PROCEDURE — 83690 ASSAY OF LIPASE: CPT | Performed by: EMERGENCY MEDICINE

## 2025-06-03 PROCEDURE — 63600175 PHARM REV CODE 636 W HCPCS: Performed by: EMERGENCY MEDICINE

## 2025-06-03 PROCEDURE — 87086 URINE CULTURE/COLONY COUNT: CPT | Performed by: EMERGENCY MEDICINE

## 2025-06-03 PROCEDURE — 96374 THER/PROPH/DIAG INJ IV PUSH: CPT

## 2025-06-03 PROCEDURE — 25500020 PHARM REV CODE 255: Performed by: EMERGENCY MEDICINE

## 2025-06-03 RX ORDER — ONDANSETRON 4 MG/1
4 TABLET, ORALLY DISINTEGRATING ORAL EVERY 6 HOURS PRN
Qty: 12 TABLET | Refills: 0 | Status: SHIPPED | OUTPATIENT
Start: 2025-06-03 | End: 2025-06-06

## 2025-06-03 RX ORDER — ONDANSETRON HYDROCHLORIDE 2 MG/ML
4 INJECTION, SOLUTION INTRAVENOUS
Status: COMPLETED | OUTPATIENT
Start: 2025-06-03 | End: 2025-06-03

## 2025-06-03 RX ORDER — KETOROLAC TROMETHAMINE 10 MG/1
10 TABLET, FILM COATED ORAL EVERY 6 HOURS PRN
Qty: 20 TABLET | Refills: 0 | Status: SHIPPED | OUTPATIENT
Start: 2025-06-03 | End: 2025-06-08

## 2025-06-03 RX ADMIN — IOHEXOL 100 ML: 350 INJECTION, SOLUTION INTRAVENOUS at 01:06

## 2025-06-03 RX ADMIN — ONDANSETRON 4 MG: 2 INJECTION INTRAMUSCULAR; INTRAVENOUS at 01:06

## 2025-06-03 NOTE — ED PROVIDER NOTES
"Encounter Date: 6/3/2025       History     Chief Complaint   Patient presents with    Abdominal Pain     Patient reports stomach pain, lower abdominal pain, and right flank pain onset "a few hours ago."     74-year-old male who presents to the emergency department for evaluation due to generalized abdominal pain with a feeling of abdominal distention.  Onset several hours prior to ED evaluation.  The patient also reports that he feels right flank pain.  Associated nausea.  No vomiting.  History of previous kidney stones.  The patient has also had a bowel resection 6 years prior to this ED evaluation.        Review of patient's allergies indicates:  No Known Allergies  Past Medical History:   Diagnosis Date    Acid reflux     Cancer     prostate cancer    Disc disorder     Diverticulitis     Diverticulitis 08/2020    Hyperlipidemia     Kidney stone     Neuropathy     Obesity     BHASKAR (obstructive sleep apnea)     machine used    PAD (peripheral artery disease)     Sinusitis     Vitamin D deficiency      Past Surgical History:   Procedure Laterality Date    COLON SURGERY      resection    CYSTOSCOPY W/ RETROGRADES Bilateral 07/05/2023    Procedure: CYSTOSCOPY, WITH RETROGRADE PYELOGRAM;  Surgeon: Stew Bernard MD;  Location: [facility] OR;  Service: Urology;  Laterality: Bilateral;  7am per Vera    CYSTOSCOPY W/ RETROGRADES Bilateral 07/20/2023    Procedure: CYSTOSCOPY, WITH RETROGRADE PYELOGRAM;  Surgeon: Stew Bernard MD;  Location: [facility] OR;  Service: Urology;  Laterality: Bilateral;    EXTRACORPOREAL SHOCK WAVE LITHOTRIPSY Right 07/20/2023    Procedure: LITHOTRIPSY, ESWL;  Surgeon: Stew Bernard MD;  Location: [facility] OR;  Service: Urology;  Laterality: Right;    EXTRACTION - STONE Bilateral 07/05/2023    Procedure: EXTRACTION - STONE;  Surgeon: Stew Bernard MD;  Location: [facility] OR;  Service: Urology;  Laterality: Bilateral;    LASER LITHOTRIPSY Left 07/05/2023    Procedure: LITHOTRIPSY, USING " LASER;  Surgeon: Stew Bernard MD;  Location: [facility] OR;  Service: Urology;  Laterality: Left;    LASER LITHOTRIPSY Right 07/20/2023    Procedure: LITHOTRIPSY, USING LASER;  Surgeon: Stew Bernard MD;  Location: [facility] OR;  Service: Urology;  Laterality: Right;    LUNG REMOVAL, PARTIAL Right     PROSTATECTOMY      REMOVAL, STENT, URETER Bilateral 07/20/2023    Procedure: REMOVAL, STENT, URETER;  Surgeon: Stew Bernard MD;  Location: [facility] OR;  Service: Urology;  Laterality: Bilateral;    URETERAL STENT PLACEMENT Bilateral 07/05/2023    Procedure: INSERTION, STENT, URETER;  Surgeon: Stew Bernard MD;  Location: [facility] OR;  Service: Urology;  Laterality: Bilateral;    URETEROSCOPY Left 07/05/2023    Procedure: URETEROSCOPY;  Surgeon: Stew Bernard MD;  Location: [facility] OR;  Service: Urology;  Laterality: Left;    URETEROSCOPY Right 07/20/2023    Procedure: URETEROSCOPY;  Surgeon: Stew Bernard MD;  Location: [facility] OR;  Service: Urology;  Laterality: Right;     Family History   Problem Relation Name Age of Onset    Cancer Father      Prostate cancer Father      Hypertension Father      Cancer Mother      Breast cancer Mother      Hypertension Mother      Thyroid cancer Son       Social History[1]  Review of Systems   Gastrointestinal:  Positive for abdominal pain and nausea.   All other systems reviewed and are negative.      Physical Exam     Initial Vitals   BP Pulse Resp Temp SpO2   06/03/25 0107 06/03/25 0103 06/03/25 0103 06/03/25 0107 06/03/25 0103   (!) 168/85 61 18 97.8 °F (36.6 °C) 100 %      MAP       --                Physical Exam    Nursing note and vitals reviewed.  Constitutional: He appears well-developed.   HENT:   Head: Normocephalic and atraumatic.   Eyes: EOM are normal. Pupils are equal, round, and reactive to light.   Neck:   Normal range of motion.  Cardiovascular:  Normal rate, regular rhythm and normal heart sounds.           Pulmonary/Chest: Breath sounds normal.    Abdominal: Abdomen is soft. Bowel sounds are normal. He exhibits distension. There is abdominal tenderness.   Mild generalized abdominal distention.  Mild tenderness to palpation of the right flank.  No rebound.  No rigidity. There is no rebound and no guarding.   Musculoskeletal:         General: Normal range of motion.      Cervical back: Normal range of motion.     Neurological: He is alert and oriented to person, place, and time. He has normal strength. GCS score is 15.   Skin: Skin is warm and dry.   Psychiatric: He has a normal mood and affect. His mood appears not anxious.         ED Course   Procedures  Labs Reviewed   COMPREHENSIVE METABOLIC PANEL - Abnormal       Result Value    Sodium 140      Potassium 4.3      Chloride 106      CO2 26      Glucose 132 (*)     BUN 20      Creatinine 1.4      Calcium 9.1      Protein Total 7.2      Albumin 4.2      Bilirubin Total 0.8      ALP 63      AST 25      ALT 23      Anion Gap 8      eGFR 53 (*)    URINALYSIS, REFLEX TO URINE CULTURE - Abnormal    Color, UA Yellow      Appearance, UA Cloudy (*)     pH, UA 6.0      Spec Grav UA 1.015      Protein, UA 2+ (*)     Glucose, UA Negative      Ketones, UA Trace (*)     Bilirubin, UA Negative      Blood, UA 3+ (*)     Nitrites, UA Negative      Urobilinogen, UA 1.0      Leukocyte Esterase, UA 2+ (*)    CBC WITH DIFFERENTIAL - Abnormal    WBC 11.57      RBC 5.48      HGB 16.6      HCT 49.7      MCV 91      MCH 30.3      MCHC 33.4      RDW 13.0      Platelet Count 218      MPV 11.0      Nucleated RBC 0      Neut % 82.2 (*)     Lymph % 9.3 (*)     Mono % 6.6      Eos % 1.0      Basophil % 0.5      Imm Grans % 0.4      Neut # 9.51 (*)     Lymph # 1.08      Mono # 0.76      Eos # 0.11      Baso # 0.06      Imm Grans # 0.05 (*)    URINALYSIS MICROSCOPIC - Abnormal    RBC, UA >100 (*)     WBC, UA 18 (*)     Bacteria, UA None      Squamous Epithelial Cells, UA 0      Hyaline Casts, UA 1      Microscopic Comment       LIPASE -  Normal    Lipase Level 28     CULTURE, URINE   CBC W/ AUTO DIFFERENTIAL    Narrative:     The following orders were created for panel order CBC W/ AUTO DIFFERENTIAL.  Procedure                               Abnormality         Status                     ---------                               -----------         ------                     CBC with Differential[[id number]]       Abnormal            Final result                 Please view results for these tests on the individual orders.   GREY TOP URINE HOLD    Extra Tube Hold for add-ons.     EXTRA TUBES    Narrative:     The following orders were created for panel order EXTRA TUBES.  Procedure                               Abnormality         Status                     ---------                               -----------         ------                     Light Blue Top Hold[[id number]]                             Final result               Gold Top Hold[[id number]]                                   Final result                 Please view results for these tests on the individual orders.   LIGHT BLUE TOP HOLD    Extra Tube HOLD     GOLD TOP HOLD    Extra Tube HOLD            Imaging Results              CT Abdomen Pelvis With IV Contrast NO Oral Contrast (In process)                   X-Rays:   Independently Interpreted Readings:   Abdomen: CT SCAN ABDOMEN AND PELVIS:  Perinephric fat stranding.  A 2.2 cm stone in the right renal pelvis causing right hydronephrosis.  No CT evidence for appendicitis.  No obstruction.  No wall thickening.  No CT evidence of colitis or acute diverticulitis. (CT interpreted by Hipolito Burns MD with [facility]).     Medications   ondansetron injection 4 mg (4 mg Intravenous Given 6/3/25 0158)   iohexoL (OMNIPAQUE 350) injection 100 mL (100 mLs Intravenous Given 6/3/25 0149)     Medical Decision Making  Amount and/or Complexity of Data Reviewed  Labs: ordered.  Radiology: ordered.    Risk  Prescription drug management.                ED Course as of 06/03/25 0515   Tue Jun 03, 2025   0400 Waiting for CT Abdomen and Pelvis Results. [DH]   0509 No acute distress.  Unremarkable chemistries.  Unremarkable CBC.  No urinary tract infection.  CT scan of the abdomen and pelvis reveals a 2.2 cm stone in the right renal pelvis (no CT evidence for appendicitis).  Vital signs stable.  Afebrile.  Discharge home. [DH]      ED Course User Index  [DH] Reid Sage DO                           Clinical Impression:  Final diagnoses:  [N20.0] Right kidney stone (Primary)          ED Disposition Condition    Discharge Stable          ED Prescriptions       Medication Sig Dispense Start Date End Date Auth. Provider    ondansetron (ZOFRAN-ODT) 4 MG TbDL Take 1 tablet (4 mg total) by mouth every 6 (six) hours as needed (nausea/vomiting). 12 tablet 6/3/2025 6/6/2025 Reid Sage DO    ketorolac (TORADOL) 10 mg tablet Take 1 tablet (10 mg total) by mouth every 6 (six) hours as needed for Pain. 20 tablet 6/3/2025 6/8/2025 Reid Sage DO          Follow-up Information       Follow up With Specialties Details Why Contact Info    Follow up with your Urologist in 2 days.        Oni Travis Jr., MD Internal Medicine In 2 days  [address] [facility] [id number] [phone]                     [1]   Social History  Tobacco Use    Smoking status: Former     Types: Cigarettes    Smokeless tobacco: Never    Tobacco comments:     Quit 40 yrs ago   Substance Use Topics    Alcohol use: No     Alcohol/week: 0.0 standard drinks of alcohol    Drug use: No        Reid Sage DO  06/03/25 0515

## 2025-06-03 NOTE — DISCHARGE INSTRUCTIONS
Return to the emergency department for re-evaluation if you develop worsening pain, uncontrolled nausea and vomiting, and/or fever.

## 2025-06-04 LAB — BACTERIA UR CULT: ABNORMAL

## 2025-06-27 ENCOUNTER — HOSPITAL ENCOUNTER (EMERGENCY)
Facility: HOSPITAL | Age: 75
Discharge: HOME | End: 2025-06-27
Attending: EMERGENCY MEDICINE
Payer: MEDICARE

## 2025-06-27 VITALS
HEART RATE: 77 BPM | WEIGHT: 218.06 LBS | DIASTOLIC BLOOD PRESSURE: 64 MMHG | OXYGEN SATURATION: 98 % | HEIGHT: 69 IN | TEMPERATURE: 98 F | SYSTOLIC BLOOD PRESSURE: 135 MMHG | RESPIRATION RATE: 18 BRPM | BODY MASS INDEX: 32.3 KG/M2

## 2025-06-27 DIAGNOSIS — T83.092A OBSTRUCTED NEPHROSTOMY TUBE: Primary | ICD-10-CM

## 2025-06-27 PROCEDURE — 99281 EMR DPT VST MAYX REQ PHY/QHP: CPT

## 2025-06-28 NOTE — ED PROVIDER NOTES
EMERGENCY DEPARTMENT HISTORY AND PHYSICAL EXAM     This note is dictated on M*Modal word recognition program.  There are word recognition mistakes and grammatical errors that are occasionally missed on review.     Date: 6/27/2025   Patient Name: Fareed Dillard III       History of Presenting Illness      Chief Complaint   Patient presents with    Clogged Nephrostomy Bag     Pt to the ER for replacement of nephrostomy bag,  states he had a pcnl preformed today and valve to drain the nephrosotomy bag is now clogged.            Fareed Dillard III is a 75 y.o. male with PMHX of kidney stone who presents to the emergency department C/O clogged nephrostomy bag.    Patient has right nephrostomy placed today due to 2 cm proximal right kidney stone.  States it bag became clogged tonight.    PCP: Oni Travis Jr., MD      Current Medications[1]        Past History     Past Medical History:   Past Medical History:   Diagnosis Date    Acid reflux     Cancer     prostate cancer    Disc disorder     Diverticulitis     Diverticulitis 08/2020    Hyperlipidemia     Kidney stone     Neuropathy     Obesity     BHASKAR (obstructive sleep apnea)     machine used    PAD (peripheral artery disease)     Sinusitis     Vitamin D deficiency         Past Surgical History:   Past Surgical History:   Procedure Laterality Date    COLON SURGERY      resection    CYSTOSCOPY W/ RETROGRADES Bilateral 07/05/2023    Procedure: CYSTOSCOPY, WITH RETROGRADE PYELOGRAM;  Surgeon: Stew Bernard MD;  Location: [facility] OR;  Service: Urology;  Laterality: Bilateral;  7am per Vera    CYSTOSCOPY W/ RETROGRADES Bilateral 07/20/2023    Procedure: CYSTOSCOPY, WITH RETROGRADE PYELOGRAM;  Surgeon: Stew Bernard MD;  Location: [facility] OR;  Service: Urology;  Laterality: Bilateral;    EXTRACORPOREAL SHOCK WAVE LITHOTRIPSY Right 07/20/2023    Procedure: LITHOTRIPSY, ESWL;  Surgeon: Stew Bernard MD;  Location: [facility] OR;  Service: Urology;  Laterality: Right;     EXTRACTION - STONE Bilateral 07/05/2023    Procedure: EXTRACTION - STONE;  Surgeon: Stew Bernard MD;  Location: [facility] OR;  Service: Urology;  Laterality: Bilateral;    LASER LITHOTRIPSY Left 07/05/2023    Procedure: LITHOTRIPSY, USING LASER;  Surgeon: Stew Bernard MD;  Location: [facility] OR;  Service: Urology;  Laterality: Left;    LASER LITHOTRIPSY Right 07/20/2023    Procedure: LITHOTRIPSY, USING LASER;  Surgeon: Stew Bernard MD;  Location: [facility] OR;  Service: Urology;  Laterality: Right;    LUNG REMOVAL, PARTIAL Right     PROSTATECTOMY      REMOVAL, STENT, URETER Bilateral 07/20/2023    Procedure: REMOVAL, STENT, URETER;  Surgeon: Stew Bernard MD;  Location: [facility] OR;  Service: Urology;  Laterality: Bilateral;    URETERAL STENT PLACEMENT Bilateral 07/05/2023    Procedure: INSERTION, STENT, URETER;  Surgeon: Stew Bernard MD;  Location: [facility] OR;  Service: Urology;  Laterality: Bilateral;    URETEROSCOPY Left 07/05/2023    Procedure: URETEROSCOPY;  Surgeon: Stew Bernard MD;  Location: [facility] OR;  Service: Urology;  Laterality: Left;    URETEROSCOPY Right 07/20/2023    Procedure: URETEROSCOPY;  Surgeon: Stew Brenard MD;  Location: [facility] OR;  Service: Urology;  Laterality: Right;        Family History:   Family History   Problem Relation Name Age of Onset    Cancer Father      Prostate cancer Father      Hypertension Father      Cancer Mother      Breast cancer Mother      Hypertension Mother      Thyroid cancer Son          Social History:   Social History[2]     Allergies:   Review of patient's allergies indicates:  No Known Allergies       Review of Systems   Review of Systems   See HPI for pertinent positives and negatives       Physical Exam     Vitals:    06/27/25 2303   BP: 135/64   BP Location: Right arm   Patient Position: Standing   Pulse: 77   Resp: 18   Temp: 98.2 °F (36.8 °C)   TempSrc: Oral   SpO2: 98%   Weight: 98.9 kg (218 lb 0.6 oz)      Physical Exam  Vitals and  nursing note reviewed.   Constitutional:       General: He is not in acute distress.     Appearance: Normal appearance. He is well-developed. He is not ill-appearing.   HENT:      Head: Normocephalic and atraumatic.   Eyes:      Extraocular Movements: Extraocular movements intact.      Conjunctiva/sclera: Conjunctivae normal.   Pulmonary:      Effort: Pulmonary effort is normal. No respiratory distress.   Abdominal:      Comments: Right nephrostomy bag draining blood-tinged urine   Musculoskeletal:         General: No deformity or signs of injury. Normal range of motion.      Cervical back: Normal range of motion. No rigidity.   Skin:     General: Skin is dry.      Coloration: Skin is not pale.      Findings: No rash.   Neurological:      General: No focal deficit present.      Mental Status: He is alert and oriented to person, place, and time.      Cranial Nerves: No cranial nerve deficit.      Motor: No weakness.      Coordination: Coordination normal.              Diagnostic Study Results      Labs -   No results found for this or any previous visit (from the past 12 hours).     Radiologic Studies -    No orders to display        Medications given in the ED-   Medications - No data to display        Medical Decision Making    I am the first provider for this patient.     I reviewed the vital signs, available nursing notes, past medical history, past surgical history, family history and social history.     Vital Signs:  Reviewed the patient's vital signs.     Pulse Oximetry Analysis and Interpretation:    98% on Room Air, normal      External Test Results (Pertinent to encounter):    Records Reviewed: Nursing Notes and Current Prescription Medications    History Obtained By: Patient and Spouse    Provider Notes: Fareed Dillard III is a 75 y.o. male with clogged nephrostomy bag    Co-morbidities Considered:     Differential Diagnosis:       ED Course:    Bag for was able to be flushed with saline and now has not resumed  draining.  No clotting of nephrostomy tube.  Advised to call Urology Clinic on Monday for further guidance on management and trouble shooting of bag issues         Problems Addressed:  Clogged nephrostomy bag    Procedures:   Procedures       Diagnosis and Disposition     Critical Care:      DISCHARGE NOTE:       Fareed Dillard III's  results have been reviewed with him.  He has been counseled regarding his diagnosis, treatment, and plan.  He verbally conveys understanding and agreement of the signs, symptoms, diagnosis, treatment and prognosis and additionally agrees to follow up as discussed.  He also agrees with the care-plan and conveys that all of his questions have been answered.  I have also provided discharge instructions for him that include: educational information regarding their diagnosis and treatment, and list of reasons why they would want to return to the ED prior to their follow-up appointment, should his condition change. He has been provided with education for proper emergency department utilization.         CLINICAL IMPRESSION:         1. Obstructed nephrostomy tube              PLAN:   1. Discharge Home  2.      Medication List        ASK your doctor about these medications      azelastine 137 mcg (0.1 %) nasal spray  Commonly known as: ASTELIN  1 spray (137 mcg total) by Nasal route 2 (two) times daily.     cetirizine 10 MG tablet  Commonly known as: ZYRTEC  Take 1 tablet (10 mg total) by mouth once daily. for 10 days     cholecalciferol (vitamin D3) 25 mcg (1,000 unit) capsule  Commonly known as: VITAMIN D3     diazePAM 10 MG Tab  Commonly known as: VALIUM  Take 1 tablet (10 mg total) by mouth once. for 1 dose     fluticasone propionate 50 mcg/actuation nasal spray  Commonly known as: FLONASE  1 spray (50 mcg total) by Each Nostril route 2 (two) times daily as needed.     rosuvastatin 20 MG tablet  Commonly known as: CRESTOR  Take 1 tablet (20 mg total) by mouth once daily.     sildenafiL 100 MG  tablet  Commonly known as: VIAGRA  Take 1 tablet (100 mg total) by mouth daily as needed for Erectile Dysfunction.     temazepam 15 mg Cap  Commonly known as: RESTORIL  TAKE 1 CAPSULE BY MOUTH EVERY EVENING WILL MAKE DROWSY     vitamin E 1000 UNIT capsule             3. Oni Travis Jr., MD  [address] [facility] [address]  [phone]    Schedule an appointment as soon as possible for a visit   As needed    Stew Bernard MD  [facility]  [address] [place] [id number] [phone]    Schedule an appointment as soon as possible for a visit   As needed       _______________________________     Please note that this dictation was completed with [employer], the [employer] voice recognition software.  Quite often unanticipated grammatical, syntax, homophones, and other interpretive errors are inadvertently transcribed by the computer software.  Please disregard these errors.  Please excuse any errors that have escaped final proofreading.               [1]   No current facility-administered medications for this encounter.     Current Outpatient Medications   Medication Sig Dispense Refill    azelastine (ASTELIN) 137 mcg (0.1 %) nasal spray 1 spray (137 mcg total) by Nasal route 2 (two) times daily. 30 mL 6    cetirizine (ZYRTEC) 10 MG tablet Take 1 tablet (10 mg total) by mouth once daily. for 10 days 10 tablet 0    cholecalciferol, vitamin D3, (VITAMIN D3) 25 mcg (1,000 unit) capsule Take 1,000 Units by mouth once daily.      diazePAM (VALIUM) 10 MG Tab Take 1 tablet (10 mg total) by mouth once. for 1 dose 1 tablet 0    fluticasone propionate (FLONASE) 50 mcg/actuation nasal spray 1 spray (50 mcg total) by Each Nostril route 2 (two) times daily as needed. 15 g 0    rosuvastatin (CRESTOR) 20 MG tablet Take 1 tablet (20 mg total) by mouth once daily. 30 tablet 5    sildenafil (VIAGRA) 100 MG tablet Take 1 tablet (100 mg total) by mouth daily as needed for Erectile Dysfunction. 10  tablet 11    temazepam (RESTORIL) 15 mg Cap TAKE 1 CAPSULE BY MOUTH EVERY EVENING WILL MAKE DROWSY 30 capsule 5    vitamin E 1000 UNIT capsule Take 1,000 Units by mouth once daily.       Facility-Administered Medications Ordered in Other Encounters   Medication Dose Route Frequency Provider Last Rate Last Admin    lactated ringers infusion   Intravenous Continuous Mc Almeida Jr., MD   Stopped at 07/20/23 4733   [2]   Social History  Tobacco Use    Smoking status: Former     Types: Cigarettes    Smokeless tobacco: Never    Tobacco comments:     Quit 40 yrs ago   Substance Use Topics    Alcohol use: No     Alcohol/week: 0.0 standard drinks of alcohol    Drug use: No        Marlon Parra MD  06/27/25 0622

## 2025-07-02 ENCOUNTER — HOSPITAL ENCOUNTER (OUTPATIENT)
Dept: RADIOLOGY | Facility: HOSPITAL | Age: 75
Discharge: HOME | End: 2025-07-02
Attending: FAMILY MEDICINE
Payer: MEDICARE

## 2025-07-02 ENCOUNTER — LAB VISIT (OUTPATIENT)
Dept: LAB | Facility: HOSPITAL | Age: 75
End: 2025-07-02
Attending: FAMILY MEDICINE
Payer: MEDICARE

## 2025-07-02 DIAGNOSIS — R10.9 ABDOMINAL PAIN, UNSPECIFIED ABDOMINAL LOCATION: ICD-10-CM

## 2025-07-02 DIAGNOSIS — R19.00 ABDOMINAL SWELLING: ICD-10-CM

## 2025-07-02 LAB
ALBUMIN SERPL BCP-MCNC: 3.5 G/DL (ref 3.5–5.2)
ALP SERPL-CCNC: 60 UNIT/L (ref 40–150)
ALT SERPL W/O P-5'-P-CCNC: 18 UNIT/L (ref 10–44)
ANION GAP (OHS): 8 MMOL/L (ref 8–16)
AST SERPL-CCNC: 19 UNIT/L (ref 11–45)
BILIRUB SERPL-MCNC: 0.5 MG/DL (ref 0.1–1)
BUN SERPL-MCNC: 19 MG/DL (ref 8–23)
CALCIUM SERPL-MCNC: 9.3 MG/DL (ref 8.7–10.5)
CHLORIDE SERPL-SCNC: 103 MMOL/L (ref 95–110)
CO2 SERPL-SCNC: 28 MMOL/L (ref 23–29)
CREAT SERPL-MCNC: 1.1 MG/DL (ref 0.5–1.4)
GFR SERPLBLD CREATININE-BSD FMLA CKD-EPI: >60 ML/MIN/1.73/M2
GLUCOSE SERPL-MCNC: 104 MG/DL (ref 70–110)
POTASSIUM SERPL-SCNC: 4.3 MMOL/L (ref 3.5–5.1)
PROT SERPL-MCNC: 7.1 GM/DL (ref 6–8.4)
SODIUM SERPL-SCNC: 139 MMOL/L (ref 136–145)

## 2025-07-02 PROCEDURE — 36415 COLL VENOUS BLD VENIPUNCTURE: CPT

## 2025-07-02 PROCEDURE — 25500020 PHARM REV CODE 255: Performed by: FAMILY MEDICINE

## 2025-07-02 PROCEDURE — 84075 ASSAY ALKALINE PHOSPHATASE: CPT

## 2025-07-02 PROCEDURE — 74177 CT ABD & PELVIS W/CONTRAST: CPT | Mod: TC

## 2025-07-02 RX ADMIN — IOHEXOL 100 ML: 350 INJECTION, SOLUTION INTRAVENOUS at 04:07
